# Patient Record
Sex: MALE | Employment: OTHER | URBAN - METROPOLITAN AREA
[De-identification: names, ages, dates, MRNs, and addresses within clinical notes are randomized per-mention and may not be internally consistent; named-entity substitution may affect disease eponyms.]

---

## 2018-06-15 ENCOUNTER — HOSPITAL ENCOUNTER (OUTPATIENT)
Age: 73
Discharge: HOME | End: 2018-06-17
Payer: MEDICARE

## 2018-06-15 PROCEDURE — 87088 URINE BACTERIA CULTURE: CPT

## 2018-06-15 PROCEDURE — 88112 CYTOPATH CELL ENHANCE TECH: CPT

## 2018-06-18 LAB — URINE CULTURE, ROUTINE: NORMAL

## 2019-02-01 ENCOUNTER — HOSPITAL ENCOUNTER (OUTPATIENT)
Age: 74
Discharge: HOME | End: 2019-02-03
Payer: MEDICARE

## 2019-02-01 LAB — PROSTATE SPECIFIC ANTIGEN: 4.24 NG/ML (ref 0–4)

## 2019-02-01 PROCEDURE — 88112 CYTOPATH CELL ENHANCE TECH: CPT

## 2019-02-01 PROCEDURE — G0103 PSA SCREENING: HCPCS

## 2019-02-25 ENCOUNTER — HOSPITAL ENCOUNTER (OUTPATIENT)
Age: 74
Discharge: HOME | End: 2019-02-27
Payer: MEDICARE

## 2019-02-25 ENCOUNTER — HOSPITAL ENCOUNTER (OUTPATIENT)
Dept: GENERAL RADIOLOGY | Age: 74
Discharge: HOME | End: 2019-02-27
Payer: MEDICARE

## 2019-02-25 DIAGNOSIS — N20.0 CALCULUS OF KIDNEY: ICD-10-CM

## 2019-02-25 PROCEDURE — 74018 RADEX ABDOMEN 1 VIEW: CPT

## 2019-05-22 ENCOUNTER — HOSPITAL ENCOUNTER (OUTPATIENT)
Age: 74
Discharge: HOME | End: 2019-05-24
Payer: MEDICARE

## 2019-05-22 PROCEDURE — 87077 CULTURE AEROBIC IDENTIFY: CPT

## 2019-05-22 PROCEDURE — 88112 CYTOPATH CELL ENHANCE TECH: CPT

## 2019-05-22 PROCEDURE — 87088 URINE BACTERIA CULTURE: CPT

## 2019-05-22 PROCEDURE — 87186 SC STD MICRODIL/AGAR DIL: CPT

## 2019-05-24 LAB
ORGANISM: ABNORMAL
URINE CULTURE, ROUTINE: ABNORMAL
URINE CULTURE, ROUTINE: ABNORMAL

## 2019-06-28 ENCOUNTER — HOSPITAL ENCOUNTER (OUTPATIENT)
Age: 74
Discharge: HOME | End: 2019-06-30
Payer: MEDICARE

## 2019-06-28 PROCEDURE — 88112 CYTOPATH CELL ENHANCE TECH: CPT

## 2024-05-30 ENCOUNTER — HOSPITAL ENCOUNTER (OUTPATIENT)
Dept: RESPIRATORY THERAPY | Facility: HOSPITAL | Age: 79
Discharge: HOME | End: 2024-05-30
Payer: MEDICARE

## 2024-05-30 ENCOUNTER — OFFICE VISIT (OUTPATIENT)
Dept: PULMONOLOGY | Facility: HOSPITAL | Age: 79
End: 2024-05-30
Payer: MEDICARE

## 2024-05-30 ENCOUNTER — LAB (OUTPATIENT)
Dept: LAB | Facility: LAB | Age: 79
End: 2024-05-30
Payer: MEDICARE

## 2024-05-30 VITALS
HEIGHT: 66 IN | RESPIRATION RATE: 16 BRPM | HEART RATE: 66 BPM | SYSTOLIC BLOOD PRESSURE: 142 MMHG | BODY MASS INDEX: 27.61 KG/M2 | DIASTOLIC BLOOD PRESSURE: 75 MMHG | OXYGEN SATURATION: 96 % | TEMPERATURE: 97.8 F | WEIGHT: 171.8 LBS

## 2024-05-30 DIAGNOSIS — J84.9 INTERSTITIAL LUNG DISEASE (MULTI): ICD-10-CM

## 2024-05-30 DIAGNOSIS — J84.10 PULMONARY FIBROSIS (MULTI): ICD-10-CM

## 2024-05-30 DIAGNOSIS — J30.9 ALLERGIC RHINITIS, UNSPECIFIED SEASONALITY, UNSPECIFIED TRIGGER: ICD-10-CM

## 2024-05-30 DIAGNOSIS — G47.33 OSA ON CPAP: ICD-10-CM

## 2024-05-30 DIAGNOSIS — Z87.891 FORMER SMOKER: ICD-10-CM

## 2024-05-30 DIAGNOSIS — J84.9 INTERSTITIAL LUNG DISEASE (MULTI): Primary | ICD-10-CM

## 2024-05-30 LAB
CCP IGG SERPL-ACNC: <1 U/ML
RHEUMATOID FACT SER NEPH-ACNC: 34 IU/ML (ref 0–15)

## 2024-05-30 PROCEDURE — 99214 OFFICE O/P EST MOD 30 MIN: CPT | Performed by: NURSE PRACTITIONER

## 2024-05-30 PROCEDURE — 86003 ALLG SPEC IGE CRUDE XTRC EA: CPT

## 2024-05-30 PROCEDURE — 86606 ASPERGILLUS ANTIBODY: CPT

## 2024-05-30 PROCEDURE — 94618 PULMONARY STRESS TESTING: CPT

## 2024-05-30 PROCEDURE — 86038 ANTINUCLEAR ANTIBODIES: CPT

## 2024-05-30 PROCEDURE — 36415 COLL VENOUS BLD VENIPUNCTURE: CPT

## 2024-05-30 PROCEDURE — 86331 IMMUNODIFFUSION OUCHTERLONY: CPT

## 2024-05-30 PROCEDURE — 86200 CCP ANTIBODY: CPT

## 2024-05-30 PROCEDURE — 86431 RHEUMATOID FACTOR QUANT: CPT

## 2024-05-30 PROCEDURE — 86225 DNA ANTIBODY NATIVE: CPT

## 2024-05-30 PROCEDURE — 86235 NUCLEAR ANTIGEN ANTIBODY: CPT

## 2024-05-30 PROCEDURE — 82785 ASSAY OF IGE: CPT

## 2024-05-30 RX ORDER — CYCLOBENZAPRINE HCL 5 MG
TABLET ORAL
COMMUNITY
Start: 2024-04-18

## 2024-05-30 RX ORDER — LEVOTHYROXINE SODIUM 25 UG/1
1 TABLET ORAL DAILY
COMMUNITY
Start: 2022-11-14

## 2024-05-30 RX ORDER — METOPROLOL SUCCINATE 50 MG/1
50 TABLET, EXTENDED RELEASE ORAL DAILY
COMMUNITY

## 2024-05-30 RX ORDER — TRAZODONE HYDROCHLORIDE 50 MG/1
TABLET ORAL
COMMUNITY
Start: 2024-03-14

## 2024-05-30 RX ORDER — AMLODIPINE BESYLATE 10 MG/1
TABLET ORAL
COMMUNITY
Start: 2024-03-14

## 2024-05-30 RX ORDER — FLUTICASONE PROPIONATE 50 MCG
1 SPRAY, SUSPENSION (ML) NASAL 2 TIMES DAILY
Qty: 1 G | Refills: 11 | Status: SHIPPED | OUTPATIENT
Start: 2024-05-30

## 2024-05-30 RX ORDER — SERTRALINE HYDROCHLORIDE 50 MG/1
1 TABLET, FILM COATED ORAL DAILY
COMMUNITY
Start: 2016-08-26

## 2024-05-30 RX ORDER — BUSPIRONE HYDROCHLORIDE 10 MG/1
1 TABLET ORAL 3 TIMES DAILY
COMMUNITY
Start: 2024-01-16

## 2024-05-30 RX ORDER — ATORVASTATIN CALCIUM 20 MG/1
10 TABLET, FILM COATED ORAL
COMMUNITY
Start: 2024-03-05

## 2024-05-30 RX ORDER — TAMSULOSIN HYDROCHLORIDE 0.4 MG/1
CAPSULE ORAL
COMMUNITY
Start: 2023-04-19

## 2024-05-30 RX ORDER — DEXTROMETHORPHAN HYDROBROMIDE, GUAIFENESIN 5; 100 MG/5ML; MG/5ML
650 LIQUID ORAL EVERY 8 HOURS PRN
COMMUNITY

## 2024-05-30 ASSESSMENT — PATIENT HEALTH QUESTIONNAIRE - PHQ9
1. LITTLE INTEREST OR PLEASURE IN DOING THINGS: NOT AT ALL
2. FEELING DOWN, DEPRESSED OR HOPELESS: NOT AT ALL
SUM OF ALL RESPONSES TO PHQ9 QUESTIONS 1 AND 2: 0

## 2024-05-30 ASSESSMENT — ENCOUNTER SYMPTOMS
UNEXPECTED WEIGHT CHANGE: 0
DEPRESSION: 0
SHORTNESS OF BREATH: 1
LOSS OF SENSATION IN FEET: 0
OCCASIONAL FEELINGS OF UNSTEADINESS: 0
COUGH: 0
FATIGUE: 0
FEVER: 0
CHILLS: 0
RHINORRHEA: 0
WHEEZING: 0

## 2024-05-30 ASSESSMENT — COLUMBIA-SUICIDE SEVERITY RATING SCALE - C-SSRS
6. HAVE YOU EVER DONE ANYTHING, STARTED TO DO ANYTHING, OR PREPARED TO DO ANYTHING TO END YOUR LIFE?: NO
2. HAVE YOU ACTUALLY HAD ANY THOUGHTS OF KILLING YOURSELF?: NO
1. IN THE PAST MONTH, HAVE YOU WISHED YOU WERE DEAD OR WISHED YOU COULD GO TO SLEEP AND NOT WAKE UP?: NO

## 2024-05-30 NOTE — PATIENT INSTRUCTIONS
Please get CT scan of your chest done.  Please get oxygen test before next visit.  Please get blood work done.  Start on Flonase nasal spray twice a day as needed.  You can also take Loratadine 10 mg daily as needed for sinus drainage.  Call with any questions or concerns.  Follow up with Dr. Rojas in 2 months.

## 2024-05-30 NOTE — PROGRESS NOTES
Subjective   Patient ID: Alin Kessler is a 78 y.o. male who presents for NPV for pulmonary fibrosis.     HPI: Patient has PMH of CAD, depression, HTN, HLD, ALEX, and atrial fibrillation. He is here for a second opinion. His wife and son are present with him today. He states that he was getting preoperative clearance for an ablation and he was told he had pulmonary fibrosis. He had one CT chest done in September 2023. He then saw a pulmonologist that told him he had mild idiopathic pulmonary fibrosis and that he needed to enjoy life, wife reports they told him he had about 3-5 years life expectancy. He is not on any oxygen. He states that he has shortness of breath but this has improved after having the ablation. He has an occasional productive cough and sinus drainage. He denies any history of CTD. He is a former smoker of only 10 years, quit 40 years ago and smoked 1 ppd. He has a history of working in factories. He has ALEX and is on CPAP. His sister had pulmonary fibrosis also.     Review of Systems   Constitutional:  Negative for chills, fatigue, fever and unexpected weight change.   HENT:  Negative for congestion, postnasal drip and rhinorrhea.    Respiratory:  Positive for shortness of breath. Negative for cough (denies hemoptysis.) and wheezing.    Cardiovascular:  Negative for chest pain and leg swelling.   All other systems reviewed and are negative.      Objective   Physical Exam  Vitals reviewed.   Constitutional:       Appearance: Normal appearance.   HENT:      Head: Normocephalic.   Cardiovascular:      Rate and Rhythm: Normal rate and regular rhythm.   Pulmonary:      Effort: Pulmonary effort is normal.      Breath sounds: Normal breath sounds.   Skin:     General: Skin is warm and dry.   Neurological:      Mental Status: He is alert.         Assessment/Plan   Pulmonary fibrosis/ILD  Former light smoker  ALEX, on CPAP  Allergic rhinitis    Plan:    Patient is here for a second opinion for pulmonary  fibrosis. He states prior pulmonologist diagnosed him with idiopathic pulmonary fibrosis and stated his life expectancy was 3-5 years. He brought his PFT results today which showed no restriction and no obstruction, his DLCO was reduced at 39. He had one CT chest that was done September 2023, I do not have images but the report states mild bronchiectasis in lower lungs and sub linear fibrotic changes without. He is short of breath on exertion but states this has improved after having his ablation. He is not on any oxygen, he is 96% on RA.    -HRCT ordered.  -PFTs from April 2024 with no obstruction or restriction, TLC was 96, DLCO 39. Discussed results at length.   -CTD workup sent, HP panel, IGE/RAST ordered.  -He is a former smoker at 1 ppd x 10 years only, quit 40 years ago. No obstruction on PFTs.  -6MWT done today in office, he did not qualify for supplemental oxygen he was at his lowest 90% on RA.  -He has ALEX and is on CPAP nightly.   -Recommend Loratadine and Flonase prn.     Overall I discussed at length that with current testing I have currently this does not appear to be a progressive ILD. Patient's CT chest does not mention honeycombing or traction bronchiectasis. Patient has not had any lung biopsy in the past. His breathing has actually improved since initial CT chest done in September 2023, he does not qualify for oxygen, and his PFTs have no restriction. I will get complete ILD workup for him at this time and bring him back with Dr. Rojas in a few weeks for follow up.

## 2024-05-31 LAB

## 2024-06-03 LAB
ANA PATTERN: ABNORMAL
ANA SER QL HEP2 SUBST: POSITIVE
ANA TITR SER IF: ABNORMAL {TITER}
CENTROMERE B AB SER-ACNC: <0.2 AI
CHROMATIN AB SERPL-ACNC: <0.2 AI
DSDNA AB SER-ACNC: 2 IU/ML
ENA JO1 AB SER QL IA: <0.2 AI
ENA RNP AB SER IA-ACNC: <0.2 AI
ENA SCL70 AB SER QL IA: <0.2 AI
ENA SM AB SER IA-ACNC: <0.2 AI
ENA SM+RNP AB SER QL IA: <0.2 AI
ENA SS-A AB SER IA-ACNC: <0.2 AI
ENA SS-B AB SER IA-ACNC: <0.2 AI
RIBOSOMAL P AB SER-ACNC: <0.2 AI

## 2024-06-04 ENCOUNTER — TELEPHONE (OUTPATIENT)
Dept: PULMONOLOGY | Facility: HOSPITAL | Age: 79
End: 2024-06-04
Payer: MEDICARE

## 2024-06-04 NOTE — TELEPHONE ENCOUNTER
Patients son acknowledged understanding. All questions answered at this time.     ----- Message from ZIYAD Fuchs sent at 6/4/2024 11:18 AM EDT -----  The positive YARED is nonspecific because the rest of the panel was negative. That blood work is ruling out connective tissue diseases as we discussed. I am still waiting on the rest of the blood work to result. Dr. Rojas will also discuss all of this further at his follow up but nothing to worry on.   ----- Message -----  From: Dalia Griffin RN  Sent: 6/4/2024   9:26 AM EDT  To: ZIYAD Fuchs    Patients son calling in requesting blood work results. He saw on the my chart that the YARED was positive and wants to know what that means. His number is [phone] for me to call him back.

## 2024-06-06 LAB
A FUMIGATUS1 AB SER QL ID: NORMAL
A FUMIGATUS6 AB SER QL ID: NORMAL
A PULLULANS AB SER QL ID: NORMAL
PIGEON SERUM AB QL ID: NORMAL
S RECTIVIRGULA AB SER QL ID: NORMAL

## 2024-06-18 ENCOUNTER — HOSPITAL ENCOUNTER (OUTPATIENT)
Dept: RADIOLOGY | Facility: HOSPITAL | Age: 79
Discharge: HOME | End: 2024-06-18
Payer: MEDICARE

## 2024-06-18 DIAGNOSIS — J84.10 PULMONARY FIBROSIS (MULTI): ICD-10-CM

## 2024-06-18 DIAGNOSIS — J84.9 INTERSTITIAL LUNG DISEASE (MULTI): ICD-10-CM

## 2024-06-18 PROCEDURE — 71250 CT THORAX DX C-: CPT

## 2024-06-18 PROCEDURE — 71250 CT THORAX DX C-: CPT | Performed by: RADIOLOGY

## 2024-06-21 ENCOUNTER — TELEPHONE (OUTPATIENT)
Dept: PULMONOLOGY | Facility: HOSPITAL | Age: 79
End: 2024-06-21
Payer: MEDICARE

## 2024-06-21 ENCOUNTER — HOSPITAL ENCOUNTER (OUTPATIENT)
Dept: RADIOLOGY | Facility: EXTERNAL LOCATION | Age: 79
Discharge: HOME | End: 2024-06-21

## 2024-06-21 NOTE — TELEPHONE ENCOUNTER
Patient acknowledged understanding. All questions answered at this time.     ----- Message from LEONORA Fuchs-CNP sent at 6/21/2024  9:58 AM EDT -----  Please call the patient and let him know that his CT chest does show still some mild scarring. I reviewed it with Dr. Rojas it does not at this time have features of the IPF (idiopathic pulmonary fibrosis) as I discussed with them at their appointment. Dr. Rojas will go over everything further at his upcoming appointment in a few weeks but I wanted to let him know in the meantime!

## 2024-07-18 ENCOUNTER — OFFICE VISIT (OUTPATIENT)
Dept: PULMONOLOGY | Facility: HOSPITAL | Age: 79
End: 2024-07-18
Payer: MEDICARE

## 2024-07-18 VITALS
HEIGHT: 66 IN | WEIGHT: 174.6 LBS | HEART RATE: 57 BPM | RESPIRATION RATE: 16 BRPM | SYSTOLIC BLOOD PRESSURE: 143 MMHG | DIASTOLIC BLOOD PRESSURE: 69 MMHG | TEMPERATURE: 98 F | BODY MASS INDEX: 28.06 KG/M2 | OXYGEN SATURATION: 98 %

## 2024-07-18 DIAGNOSIS — J30.9 ALLERGIC RHINITIS, UNSPECIFIED SEASONALITY, UNSPECIFIED TRIGGER: ICD-10-CM

## 2024-07-18 DIAGNOSIS — G47.33 OSA ON CPAP: ICD-10-CM

## 2024-07-18 DIAGNOSIS — R91.1 SOLITARY LUNG NODULE: ICD-10-CM

## 2024-07-18 DIAGNOSIS — Z87.891 FORMER SMOKER: ICD-10-CM

## 2024-07-18 DIAGNOSIS — J84.9 INTERSTITIAL LUNG DISEASE (MULTI): Primary | ICD-10-CM

## 2024-07-18 PROCEDURE — 99214 OFFICE O/P EST MOD 30 MIN: CPT | Performed by: INTERNAL MEDICINE

## 2024-07-18 PROCEDURE — 1159F MED LIST DOCD IN RCRD: CPT | Performed by: INTERNAL MEDICINE

## 2024-07-18 PROCEDURE — 1036F TOBACCO NON-USER: CPT | Performed by: INTERNAL MEDICINE

## 2024-07-18 ASSESSMENT — ENCOUNTER SYMPTOMS
RHINORRHEA: 0
UNEXPECTED WEIGHT CHANGE: 0
OCCASIONAL FEELINGS OF UNSTEADINESS: 0
FEVER: 0
WHEEZING: 0
DEPRESSION: 0
LOSS OF SENSATION IN FEET: 0
COUGH: 0
FATIGUE: 0
SHORTNESS OF BREATH: 1
CHILLS: 0

## 2024-07-18 ASSESSMENT — PATIENT HEALTH QUESTIONNAIRE - PHQ9
SUM OF ALL RESPONSES TO PHQ9 QUESTIONS 1 AND 2: 0
2. FEELING DOWN, DEPRESSED OR HOPELESS: NOT AT ALL
1. LITTLE INTEREST OR PLEASURE IN DOING THINGS: NOT AT ALL

## 2024-07-18 ASSESSMENT — COLUMBIA-SUICIDE SEVERITY RATING SCALE - C-SSRS
2. HAVE YOU ACTUALLY HAD ANY THOUGHTS OF KILLING YOURSELF?: NO
1. IN THE PAST MONTH, HAVE YOU WISHED YOU WERE DEAD OR WISHED YOU COULD GO TO SLEEP AND NOT WAKE UP?: NO
6. HAVE YOU EVER DONE ANYTHING, STARTED TO DO ANYTHING, OR PREPARED TO DO ANYTHING TO END YOUR LIFE?: NO

## 2024-07-18 NOTE — PATIENT INSTRUCTIONS
Please get CT scan of chest in Dec 2024.  Continue on Flonase nasal spray twice a day as needed.  Continue to take Loratadine 10 mg daily as needed for sinus drainage.  Call with any questions or concerns.  Follow up with Dr. Rojas in 6 months.

## 2024-07-18 NOTE — PROGRESS NOTES
Subjective   Patient ID: Alin Kessler is a 78 y.o. male who presents for Follow up for pulmonary fibrosis.     HPI: Patient has PMH of CAD, depression, HTN, HLD, ALEX, and atrial fibrillation. He is here for a second opinion. His wife and son are present with him today. He states that he was getting preoperative clearance for an ablation and he was told he had pulmonary fibrosis. He had one CT chest done in September 2023. He then saw a pulmonologist that told him he had mild idiopathic pulmonary fibrosis and that he needed to enjoy life, wife reports they told him he had about 3-5 years life expectancy. He is not on any oxygen. He states that he has shortness of breath but this has improved after having the ablation. He has an occasional productive cough and sinus drainage. He denies any history of CTD. He is a former smoker of only 10 years, quit 40 years ago and smoked 1 ppd. He has a history of working in factories. He has ALEX and is on CPAP. His sister had pulmonary fibrosis also.     He is here for follow up accompanied by his wife and son. He states he is doing well. He climbs up 14 steps to go his house without shortness of breath. He still has mild cough related to postnasal drip.  Wife states that he is a couch potato. 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 He wears his CPAP nightly and has controlled daytime symptoms and good sleep quality.     Review of Systems   Constitutional:  Negative for chills, fatigue, fever and unexpected weight change.   HENT:  Negative for congestion, postnasal drip and rhinorrhea.    Respiratory:  Positive for shortness of breath. Negative for cough (denies hemoptysis.) and wheezing.    Cardiovascular:  Negative for chest pain and leg swelling.   All other systems reviewed and are negative.      Objective   Physical Exam  Vitals reviewed.   Constitutional:       Appearance: Normal appearance.   HENT:      Head:  "Normocephalic.   Cardiovascular:      Rate and Rhythm: Normal rate and regular rhythm.   Pulmonary:      Effort: Pulmonary effort is normal.      Breath sounds: Rales present.   Skin:     General: Skin is warm and dry.   Neurological:      Mental Status: He is alert.       Assessment/Plan   Pulmonary fibrosis/ILD  Former light smoker  ALEX, on CPAP  Allergic rhinitis  Anemia  Lung nodule 7 mm lingula    Plan:    Patient is here for a second opinion for pulmonary fibrosis. He states prior pulmonologist diagnosed him with idiopathic pulmonary fibrosis and stated his life expectancy was 3-5 years. He brought his PFT results today which showed no restriction and no obstruction, his DLCO was reduced at 39. He had one CT chest that was done September 2023, the report states mild bronchiectasis in lower lungs and sub linear fibrotic changes without. He is short of breath on exertion but states this has improved after having his ablation. He is not on any oxygen, he is 96% on RA.    -HRCT repeat discussed from June 2024 with subpleural reticulations and traction bronchiectasis and no honeycombing \"probable UIP' and 7 mm lingular nodule.   -PFTs from April 2024 with no obstruction or restriction, TLC was 96, DLCO 39. Discussed results at length.   -CTD, HP panel, IGE/RAST neg. RA factor and YARED + but ccp neg and YARED positive with DSDNA neg.   -He is a former smoker at 1 ppd x 10 years, quit 40 years ago. No obstruction on PFTs.  -6MWT done today in office, he did not qualify for supplemental oxygen he was at his lowest 90% on RA.  -He has ALEX and is on CPAP nightly.   -Recommend Loratadine and Flonase prn.   -Hb in 2018 around 8 g. Recommend obtain repeat test results from PCP.     Overall I discussed at length that with current testing I have currently this does not appear to be a progressive ILD. D/d includes postinflammatory pulmonary fibrosis. His breathing has actually improved since ablation, he does not qualify for " oxygen, and his PFTs have no restriction. His CTD and HP panel work up was practically negative. He is compliant with his CPAP. We concluded our discussion with recommendation of repeating CT chest in 6 months for both ILD and lung nodule and in the meanwhile he will remain active with a regular physical activity regimen. If he has any worsening prior to next OV pt and his family will contact us earlier than scheduled visit.

## 2024-08-13 ENCOUNTER — TELEPHONE (OUTPATIENT)
Age: 79
End: 2024-08-13

## 2024-08-13 NOTE — TELEPHONE ENCOUNTER
Maritza called, Kevyn does not get pain shots or see the pain doctors any longer. He would like you to order him some Tramadol 50mg to take as needed at [facility] in [place]

## 2024-09-08 RX ORDER — TRAZODONE HYDROCHLORIDE 50 MG/1
50 TABLET, FILM COATED ORAL NIGHTLY
Qty: 90 TABLET | Refills: 0 | Status: SHIPPED | OUTPATIENT
Start: 2024-09-08

## 2024-09-11 ENCOUNTER — TELEPHONE (OUTPATIENT)
Age: 79
End: 2024-09-11

## 2024-09-11 DIAGNOSIS — J20.8 ACUTE BRONCHITIS DUE TO OTHER SPECIFIED ORGANISMS: Primary | ICD-10-CM

## 2024-09-11 RX ORDER — BENZONATATE 200 MG/1
200 CAPSULE ORAL 3 TIMES DAILY PRN
Qty: 21 CAPSULE | Refills: 0 | Status: SHIPPED | OUTPATIENT
Start: 2024-09-11 | End: 2024-09-18

## 2024-09-11 RX ORDER — DOXYCYCLINE HYCLATE 100 MG
100 TABLET ORAL 2 TIMES DAILY
Qty: 20 TABLET | Refills: 0 | Status: SHIPPED | OUTPATIENT
Start: 2024-09-11 | End: 2024-09-21

## 2024-09-12 ENCOUNTER — TELEPHONE (OUTPATIENT)
Age: 79
End: 2024-09-12

## 2024-10-03 DIAGNOSIS — N40.1 BENIGN PROSTATIC HYPERPLASIA WITH INCOMPLETE BLADDER EMPTYING: Primary | ICD-10-CM

## 2024-10-03 DIAGNOSIS — R39.14 BENIGN PROSTATIC HYPERPLASIA WITH INCOMPLETE BLADDER EMPTYING: Primary | ICD-10-CM

## 2024-10-22 ENCOUNTER — APPOINTMENT (OUTPATIENT)
Dept: PULMONOLOGY | Facility: HOSPITAL | Age: 79
End: 2024-10-22
Payer: MEDICARE

## 2024-11-20 LAB
ALBUMIN: NORMAL
ALP BLD-CCNC: NORMAL U/L
ALT SERPL-CCNC: NORMAL U/L
ANION GAP SERPL CALCULATED.3IONS-SCNC: NORMAL MMOL/L
AST SERPL-CCNC: NORMAL U/L
BASOPHILS ABSOLUTE: NORMAL
BASOPHILS RELATIVE PERCENT: NORMAL
BILIRUB SERPL-MCNC: NORMAL MG/DL
BUN BLDV-MCNC: NORMAL MG/DL
CALCIUM SERPL-MCNC: NORMAL MG/DL
CHLORIDE BLD-SCNC: NORMAL MMOL/L
CHOLESTEROL, TOTAL: NORMAL
CHOLESTEROL/HDL RATIO: NORMAL
CO2: NORMAL
CREAT SERPL-MCNC: NORMAL MG/DL
EOSINOPHILS ABSOLUTE: NORMAL
EOSINOPHILS RELATIVE PERCENT: NORMAL
GFR, ESTIMATED: NORMAL
GLUCOSE BLD-MCNC: NORMAL MG/DL
HCT VFR BLD CALC: NORMAL %
HDLC SERPL-MCNC: NORMAL MG/DL
HEMOGLOBIN: NORMAL
LDL CHOLESTEROL: NORMAL
LYMPHOCYTES ABSOLUTE: NORMAL
LYMPHOCYTES RELATIVE PERCENT: NORMAL
MCH RBC QN AUTO: NORMAL PG
MCHC RBC AUTO-ENTMCNC: NORMAL G/DL
MCV RBC AUTO: NORMAL FL
MONOCYTES ABSOLUTE: NORMAL
MONOCYTES RELATIVE PERCENT: NORMAL
NEUTROPHILS ABSOLUTE: NORMAL
NEUTROPHILS RELATIVE PERCENT: NORMAL
NONHDLC SERPL-MCNC: NORMAL MG/DL
PLATELET # BLD: NORMAL 10*3/UL
PMV BLD AUTO: NORMAL FL
POTASSIUM SERPL-SCNC: NORMAL MMOL/L
RBC # BLD: NORMAL 10*6/UL
SODIUM BLD-SCNC: NORMAL MMOL/L
TOTAL PROTEIN: NORMAL
TRIGL SERPL-MCNC: NORMAL MG/DL
VLDLC SERPL CALC-MCNC: NORMAL MG/DL
WBC # BLD: NORMAL 10*3/UL

## 2024-11-21 RX ORDER — LEVOTHYROXINE SODIUM 25 UG/1
25 TABLET ORAL DAILY
COMMUNITY
Start: 2024-09-23

## 2024-11-24 SDOH — ECONOMIC STABILITY: FOOD INSECURITY: WITHIN THE PAST 12 MONTHS, THE FOOD YOU BOUGHT JUST DIDN'T LAST AND YOU DIDN'T HAVE MONEY TO GET MORE.: NEVER TRUE

## 2024-11-24 SDOH — ECONOMIC STABILITY: FOOD INSECURITY: WITHIN THE PAST 12 MONTHS, YOU WORRIED THAT YOUR FOOD WOULD RUN OUT BEFORE YOU GOT MONEY TO BUY MORE.: NEVER TRUE

## 2024-11-24 SDOH — ECONOMIC STABILITY: TRANSPORTATION INSECURITY
IN THE PAST 12 MONTHS, HAS LACK OF TRANSPORTATION KEPT YOU FROM MEETINGS, WORK, OR FROM GETTING THINGS NEEDED FOR DAILY LIVING?: NO

## 2024-11-24 SDOH — ECONOMIC STABILITY: INCOME INSECURITY: HOW HARD IS IT FOR YOU TO PAY FOR THE VERY BASICS LIKE FOOD, HOUSING, MEDICAL CARE, AND HEATING?: NOT HARD AT ALL

## 2024-11-25 ENCOUNTER — OFFICE VISIT (OUTPATIENT)
Age: 79
End: 2024-11-25
Payer: MEDICARE

## 2024-11-25 VITALS
HEIGHT: 68 IN | OXYGEN SATURATION: 98 % | TEMPERATURE: 97.3 F | BODY MASS INDEX: 25.98 KG/M2 | RESPIRATION RATE: 16 BRPM | WEIGHT: 171.4 LBS | HEART RATE: 53 BPM | DIASTOLIC BLOOD PRESSURE: 63 MMHG | SYSTOLIC BLOOD PRESSURE: 125 MMHG

## 2024-11-25 DIAGNOSIS — I10 BENIGN HYPERTENSION: Primary | ICD-10-CM

## 2024-11-25 DIAGNOSIS — E78.2 MIXED HYPERLIPIDEMIA: ICD-10-CM

## 2024-11-25 DIAGNOSIS — I25.10 CORONARY ARTERY DISEASE INVOLVING NATIVE CORONARY ARTERY OF NATIVE HEART WITHOUT ANGINA PECTORIS: ICD-10-CM

## 2024-11-25 DIAGNOSIS — K40.20 NON-RECURRENT BILATERAL INGUINAL HERNIA WITHOUT OBSTRUCTION OR GANGRENE: ICD-10-CM

## 2024-11-25 DIAGNOSIS — I48.0 PAROXYSMAL A-FIB (HCC): ICD-10-CM

## 2024-11-25 DIAGNOSIS — E03.9 PRIMARY HYPOTHYROIDISM: ICD-10-CM

## 2024-11-25 DIAGNOSIS — C78.6 MALIGNANT NEOPLASM METASTATIC TO PERITONEUM (HCC): ICD-10-CM

## 2024-11-25 PROCEDURE — 99214 OFFICE O/P EST MOD 30 MIN: CPT | Performed by: FAMILY MEDICINE

## 2024-11-25 PROCEDURE — 4004F PT TOBACCO SCREEN RCVD TLK: CPT | Performed by: FAMILY MEDICINE

## 2024-11-25 PROCEDURE — 1123F ACP DISCUSS/DSCN MKR DOCD: CPT | Performed by: FAMILY MEDICINE

## 2024-11-25 PROCEDURE — 1126F AMNT PAIN NOTED NONE PRSNT: CPT | Performed by: FAMILY MEDICINE

## 2024-11-25 PROCEDURE — 1159F MED LIST DOCD IN RCRD: CPT | Performed by: FAMILY MEDICINE

## 2024-11-25 PROCEDURE — G8427 DOCREV CUR MEDS BY ELIG CLIN: HCPCS | Performed by: FAMILY MEDICINE

## 2024-11-25 PROCEDURE — 3078F DIAST BP <80 MM HG: CPT | Performed by: FAMILY MEDICINE

## 2024-11-25 PROCEDURE — G8419 CALC BMI OUT NRM PARAM NOF/U: HCPCS | Performed by: FAMILY MEDICINE

## 2024-11-25 PROCEDURE — 3074F SYST BP LT 130 MM HG: CPT | Performed by: FAMILY MEDICINE

## 2024-11-25 PROCEDURE — G8482 FLU IMMUNIZE ORDER/ADMIN: HCPCS | Performed by: FAMILY MEDICINE

## 2024-11-25 RX ORDER — FLUTICASONE PROPIONATE 50 MCG
2 SPRAY, SUSPENSION (ML) NASAL DAILY
COMMUNITY
Start: 2024-09-12

## 2024-11-25 RX ORDER — AMLODIPINE BESYLATE 10 MG/1
1 TABLET ORAL DAILY
COMMUNITY
Start: 2024-03-14

## 2024-11-25 RX ORDER — METOPROLOL SUCCINATE 100 MG/1
50 TABLET, EXTENDED RELEASE ORAL
COMMUNITY
Start: 2024-09-12

## 2024-11-25 RX ORDER — ATORVASTATIN CALCIUM 20 MG/1
20 TABLET, FILM COATED ORAL DAILY
COMMUNITY

## 2024-11-25 RX ORDER — BUSPIRONE HYDROCHLORIDE 10 MG/1
1 TABLET ORAL 3 TIMES DAILY
COMMUNITY
Start: 2024-01-16

## 2024-11-25 RX ORDER — FOLIC ACID 1 MG/1
1 TABLET ORAL DAILY
COMMUNITY
Start: 2024-09-12 | End: 2024-11-25

## 2024-11-25 ASSESSMENT — PATIENT HEALTH QUESTIONNAIRE - PHQ9
1. LITTLE INTEREST OR PLEASURE IN DOING THINGS: NOT AT ALL
SUM OF ALL RESPONSES TO PHQ QUESTIONS 1-9: 0
2. FEELING DOWN, DEPRESSED OR HOPELESS: NOT AT ALL
SUM OF ALL RESPONSES TO PHQ QUESTIONS 1-9: 0
SUM OF ALL RESPONSES TO PHQ9 QUESTIONS 1 & 2: 0

## 2024-11-25 ASSESSMENT — ENCOUNTER SYMPTOMS
GASTROINTESTINAL NEGATIVE: 1
FACIAL SWELLING: 0
RESPIRATORY NEGATIVE: 1
ALLERGIC/IMMUNOLOGIC NEGATIVE: 1
SINUS PAIN: 0
EYES NEGATIVE: 1
RECTAL PAIN: 0

## 2024-11-25 NOTE — ASSESSMENT & PLAN NOTE
I increased his thyroid medication to 1 a day with 2 onset    Orders:    TSH; Future    T4, Free; Future

## 2024-11-25 NOTE — PROGRESS NOTES
Kevyn Sánchez (:  1945) is a 79 y.o. male,Established patient, here for evaluation of the following chief complaint(s):  Pre-op Exam (Pt is scheduled for hernia repair on Dec 10 Dr Willoughby at  [facility].  Pt states doing ok.  Recent labs done at [facility]/in [place] and copy given to pt.   Pt just needs note of clearance with office notes and testing faxed to [phone].  Spoke to office/no form to fill out.  ), Back Pain (Pt is complaining of some back pain which \"moves around to different areas\".  ), Hypothyroidism, and Hyperlipidemia         Assessment & Plan  Benign hypertension   Chronic, at goal (stable), continue current treatment plan, medication adherence emphasized, and lifestyle modifications recommended    Orders:    Comprehensive Metabolic Panel, Fasting; Future    CBC with Auto Differential; Future    Lipid, Fasting; Future    Mixed hyperlipidemia   Chronic, at goal (stable), continue current treatment plan, medication adherence emphasized, and lifestyle modifications recommended    Orders:    Comprehensive Metabolic Panel, Fasting; Future    CBC with Auto Differential; Future    Lipid, Fasting; Future    Coronary artery disease involving native coronary artery of native heart without angina pectoris   Chronic, at goal (stable), continue current treatment plan, medication adherence emphasized, and lifestyle modifications recommended         Primary hypothyroidism    I increased his thyroid medication to 1 a day with 2 onset    Orders:    TSH; Future    T4, Free; Future    Non-recurrent bilateral inguinal hernia without obstruction or gangrene    He is medically cleared for this upcoming surgery.  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

## 2024-12-06 NOTE — PROGRESS NOTES
[facility] PRE-ADMISSION TESTING INSTRUCTIONS    The Preadmission Testing patient is instructed accordingly using the following criteria (check applicable):    ARRIVAL INSTRUCTIONS:  [x] Parking the day of Surgery is located in the Main Entrance lot.  Upon entering the door, make an immediate right to the surgery reception desk    [x] Bring photo ID and insurance card    [x] Bring in a copy of Living will or Durable Power of  papers.    [x] Please be sure to arrange for a responsible adult to provide transportation to and from the hospital    [x] Please arrange for a responsible adult to be with you for the 24 hour period post procedure due to having anesthesia    [x] If you awake am of surgery not feeling well or have temperature >100 please call [phone]    GENERAL INSTRUCTIONS:    [x] No solid foods after midnight, no gum, candy or mints.  May have clear liquids until 4 hours prior to surgery.         [x] You may brush your teeth, do not swallow any toothpaste    [x] Take medications as instructed     [x] Stop herbal supplements and vitamins 5 days prior to procedure    [x] Follow preop dosing of blood thinners per physician instructions    [] Take 1/2 dose of evening insulin, but no insulin after midnight    [] No oral diabetic medications after midnight    [] If diabetic and have low blood sugar or feel symptomatic, take 1-2oz apple juice only    [] Bring inhalers day of surgery    [] Bring urine specimen day of surgery    [x] Shower or bath with soap, lather and rinse well, AM of Surgery, no lotion, powders or creams to surgical site    [] Follow bowel prep as instructed per surgeon    [x] No tobacco products within 24 hours of surgery     [x] No alcohol or illegal drug use, marijuana included, within 24 hours of surgery.    [x] Jewelry, body piercing's, eyeglasses, contact lenses and dentures are not permitted into surgery (bring cases)      [x] Please do not wear any

## 2024-12-09 ENCOUNTER — PREP FOR PROCEDURE (OUTPATIENT)
Dept: SURGERY | Age: 79
End: 2024-12-09

## 2024-12-09 DIAGNOSIS — F51.01 PRIMARY INSOMNIA: Primary | ICD-10-CM

## 2024-12-09 RX ORDER — TRAZODONE HYDROCHLORIDE 50 MG/1
50 TABLET, FILM COATED ORAL NIGHTLY
Qty: 90 TABLET | Refills: 3 | Status: SHIPPED | OUTPATIENT
Start: 2024-12-09

## 2024-12-09 RX ORDER — SODIUM CHLORIDE 0.9 % (FLUSH) 0.9 %
5-40 SYRINGE (ML) INJECTION PRN
Status: CANCELLED | OUTPATIENT
Start: 2024-12-09

## 2024-12-09 RX ORDER — SODIUM CHLORIDE 0.9 % (FLUSH) 0.9 %
5-40 SYRINGE (ML) INJECTION EVERY 12 HOURS SCHEDULED
Status: CANCELLED | OUTPATIENT
Start: 2024-12-09

## 2024-12-09 RX ORDER — SODIUM CHLORIDE, SODIUM LACTATE, POTASSIUM CHLORIDE, CALCIUM CHLORIDE 600; 310; 30; 20 MG/100ML; MG/100ML; MG/100ML; MG/100ML
INJECTION, SOLUTION INTRAVENOUS CONTINUOUS
Status: CANCELLED | OUTPATIENT
Start: 2024-12-09

## 2024-12-09 NOTE — H&P
Chart - [id number]  User  LR       Diagnostics  Provider Notes  Nurse/Allied Health  Medications  History & Problems  Administrative  Other Clinical  Workload Items  Activity  Flowsheets  Health Mgmt  Summary    Summary  Viewing last 15 visits in date range:  03/19/24 - 01/07/25  Scrotal swelling  Urolithiasis  Right inguinal hernia  Thyroid disease  Snoring  Chronic pain  Hx of long term use of blood thinners  BPH (benign prostatic hyperplasia)  Pulmonary fibrosis  Implantable loop recorder present  Lumbosacral radiculopathy  Kidney stone  Hypotension  Cholelithiasis  COVID-19 vaccine series completed  Avascular necrosis  Hyperlipidemia  Atrial fibrillation  Hypertension  Depression  History of right shoulder replacement  History of artificial joint  History of heart surgery  History of back surgery  Status post total shoulder arthroplasty  History of total hip arthroplasty  History of coronary artery bypass graft  11/13/24  11/06/24  05/06/24  12/03/24  07/05/24  05/01/24  10/07/24  05/06/24  11/05/24  05/06/24  11/06/24  04/28/23  05/06/24  05/06/24  11/06/24  11/06/24  11/13/24  05/06/24  11/13/24  11/13/24  11/13/24  05/06/24  05/06/24  11/06/24  11/05/24  Primary Language  English  Preferred Language For Discussing Healthcare  English   Required  Hx MRSA  Hx Vancomycin-Resistant Enterococci  Clinical Trial Participation  Clinical Trial Designation  05/06/24 05/06/24 11/06/24 11/06/24 11/13/24 11/13/24 11/13/24 11/13/24  Employment  RE  Portal  Preferred Phone  [phone]  Address  [address] [place] [address]  Next of Kin  Person to Notify  Kevyn Sánchez (Son)  [phone]  Primary Insurance  Medicare Part A & B  No Data to Display  DATE TIME  LOCATION/  PROVIDER  REASON FOR VISIT  01/07/25  10:30  [facility] Lorraine Coley NP  Kidney, Ureter, Bladder (KUB)  12/10/24  09:00  Gary Harry MD rob bil ing

## 2024-12-09 NOTE — TELEPHONE ENCOUNTER
Name of Medication(s) Requested:  Requested Prescriptions     Pending Prescriptions Disp Refills    traZODone (DESYREL) 50 MG tablet [Pharmacy Med Name: traZODone HCl 50 MG Oral Tablet] 90 tablet 0     Sig: TAKE 1 TABLET BY MOUTH ONCE DAILY AT BEDTIME       Medication is on current medication list Yes    Dosage and directions were verified? Yes    Quantity verified: 90 day supply     Pharmacy Verified?  Yes    Last Appointment:  11/25/2024    Future appts:  Future Appointments   Date Time Provider Department Center   4/8/2025 11:00 AM Zechariah Oquendo DO [facility] DEP        (If no appt send self scheduling link. .REFILLAPPT)  Scheduling request sent?     [] Yes  [x] No    Does patient need updated?  [] Yes  [x] No

## 2024-12-09 NOTE — H&P (VIEW-ONLY)
Chart - [id number]  User  LR       Diagnostics  Provider Notes  Nurse/Allied Health  Medications  History & Problems  Administrative  Other Clinical  Workload Items  Activity  Flowsheets  Health Mgmt  Summary    Summary  Viewing last 15 visits in date range:  03/19/24 - 01/07/25  Scrotal swelling  Urolithiasis  Right inguinal hernia  Thyroid disease  Snoring  Chronic pain  Hx of long term use of blood thinners  BPH (benign prostatic hyperplasia)  Pulmonary fibrosis  Implantable loop recorder present  Lumbosacral radiculopathy  Kidney stone  Hypotension  Cholelithiasis  COVID-19 vaccine series completed  Avascular necrosis  Hyperlipidemia  Atrial fibrillation  Hypertension  Depression  History of right shoulder replacement  History of artificial joint  History of heart surgery  History of back surgery  Status post total shoulder arthroplasty  History of total hip arthroplasty  History of coronary artery bypass graft  11/13/24  11/06/24  05/06/24  12/03/24  07/05/24  05/01/24  10/07/24  05/06/24  11/05/24  05/06/24  11/06/24  04/28/23  05/06/24  05/06/24  11/06/24  11/06/24  11/13/24  05/06/24  11/13/24  11/13/24  11/13/24  05/06/24  05/06/24  11/06/24  11/05/24  Primary Language  English  Preferred Language For Discussing Healthcare  English   Required  Hx MRSA  Hx Vancomycin-Resistant Enterococci  Clinical Trial Participation  Clinical Trial Designation  05/06/24 05/06/24 11/06/24 11/06/24 11/13/24 11/13/24 11/13/24 11/13/24  Employment  RE  Portal  Preferred Phone  [phone]  Address  [address] [place] [address]  Next of Kin  Person to Notify  Kevyn Sánchez (Son)  [phone]  Primary Insurance  Medicare Part A & B  No Data to Display  DATE TIME  LOCATION/  PROVIDER  REASON FOR VISIT  01/07/25  10:30  [facility] Lorraine Coley NP  Kidney, Ureter, Bladder (KUB)  12/10/24  09:00  Gary Harry MD rob bil ing

## 2024-12-10 ENCOUNTER — ANESTHESIA (OUTPATIENT)
Dept: OPERATING ROOM | Age: 79
End: 2024-12-10
Payer: MEDICARE

## 2024-12-10 ENCOUNTER — HOSPITAL ENCOUNTER (OUTPATIENT)
Age: 79
Setting detail: OUTPATIENT SURGERY
Discharge: HOME | End: 2024-12-10
Attending: SURGERY | Admitting: SURGERY
Payer: MEDICARE

## 2024-12-10 ENCOUNTER — ANESTHESIA EVENT (OUTPATIENT)
Dept: OPERATING ROOM | Age: 79
End: 2024-12-10
Payer: MEDICARE

## 2024-12-10 VITALS
BODY MASS INDEX: 26.68 KG/M2 | SYSTOLIC BLOOD PRESSURE: 133 MMHG | HEART RATE: 57 BPM | WEIGHT: 170 LBS | DIASTOLIC BLOOD PRESSURE: 76 MMHG | RESPIRATION RATE: 16 BRPM | HEIGHT: 67 IN | TEMPERATURE: 97.4 F | OXYGEN SATURATION: 97 %

## 2024-12-10 DIAGNOSIS — K40.20 NON-RECURRENT BILATERAL INGUINAL HERNIA WITHOUT OBSTRUCTION OR GANGRENE: Primary | ICD-10-CM

## 2024-12-10 LAB
ANION GAP SERPL CALCULATED.3IONS-SCNC: 11 MMOL/L (ref 7–16)
BUN SERPL-MCNC: 13 MG/DL (ref 6–23)
CALCIUM SERPL-MCNC: 8.8 MG/DL (ref 8.6–10.2)
CHLORIDE SERPL-SCNC: 111 MMOL/L (ref 98–107)
CO2 SERPL-SCNC: 18 MMOL/L (ref 22–29)
CREAT SERPL-MCNC: 1.2 MG/DL (ref 0.7–1.2)
ERYTHROCYTE [DISTWIDTH] IN BLOOD BY AUTOMATED COUNT: 16 % (ref 11.5–15)
GFR, ESTIMATED: 60 ML/MIN/1.73M2
GLUCOSE SERPL-MCNC: 110 MG/DL (ref 74–99)
HCT VFR BLD AUTO: 39.8 % (ref 37–54)
HGB BLD-MCNC: 12.4 G/DL (ref 12.5–16.5)
MCH RBC QN AUTO: 26.1 PG (ref 26–35)
MCHC RBC AUTO-ENTMCNC: 31.2 G/DL (ref 32–34.5)
MCV RBC AUTO: 83.6 FL (ref 80–99.9)
PLATELET # BLD AUTO: 200 K/UL (ref 130–450)
PMV BLD AUTO: 10.4 FL (ref 7–12)
POTASSIUM SERPL-SCNC: 4.2 MMOL/L (ref 3.5–5)
RBC # BLD AUTO: 4.76 M/UL (ref 3.8–5.8)
SODIUM SERPL-SCNC: 140 MMOL/L (ref 132–146)
WBC OTHER # BLD: 11.4 K/UL (ref 4.5–11.5)

## 2024-12-10 PROCEDURE — 6360000002 HC RX W HCPCS

## 2024-12-10 PROCEDURE — 6360000002 HC RX W HCPCS: Performed by: SURGERY

## 2024-12-10 PROCEDURE — 7100000001 HC PACU RECOVERY - ADDTL 15 MIN: Performed by: SURGERY

## 2024-12-10 PROCEDURE — 85027 COMPLETE CBC AUTOMATED: CPT

## 2024-12-10 PROCEDURE — C1781 MESH (IMPLANTABLE): HCPCS | Performed by: SURGERY

## 2024-12-10 PROCEDURE — 2709999900 HC NON-CHARGEABLE SUPPLY: Performed by: SURGERY

## 2024-12-10 PROCEDURE — 3700000001 HC ADD 15 MINUTES (ANESTHESIA): Performed by: SURGERY

## 2024-12-10 PROCEDURE — 3600000019 HC SURGERY ROBOT ADDTL 15MIN: Performed by: SURGERY

## 2024-12-10 PROCEDURE — 2580000003 HC RX 258

## 2024-12-10 PROCEDURE — S2900 ROBOTIC SURGICAL SYSTEM: HCPCS | Performed by: SURGERY

## 2024-12-10 PROCEDURE — 7100000000 HC PACU RECOVERY - FIRST 15 MIN: Performed by: SURGERY

## 2024-12-10 PROCEDURE — 6370000000 HC RX 637 (ALT 250 FOR IP): Performed by: SURGERY

## 2024-12-10 PROCEDURE — 6370000000 HC RX 637 (ALT 250 FOR IP): Performed by: ANESTHESIOLOGY

## 2024-12-10 PROCEDURE — 3700000000 HC ANESTHESIA ATTENDED CARE: Performed by: SURGERY

## 2024-12-10 PROCEDURE — 7100000010 HC PHASE II RECOVERY - FIRST 15 MIN: Performed by: SURGERY

## 2024-12-10 PROCEDURE — 2500000003 HC RX 250 WO HCPCS

## 2024-12-10 PROCEDURE — 7100000011 HC PHASE II RECOVERY - ADDTL 15 MIN: Performed by: SURGERY

## 2024-12-10 PROCEDURE — 2580000003 HC RX 258: Performed by: SURGERY

## 2024-12-10 PROCEDURE — 3600000009 HC SURGERY ROBOT BASE: Performed by: SURGERY

## 2024-12-10 PROCEDURE — 80048 BASIC METABOLIC PNL TOTAL CA: CPT

## 2024-12-10 DEVICE — LAPAROSCOPIC SELF-FIXATING MESH POLYESTER WITH POLYLACTIC ACID GRIPS AND COLLAGEN FILM
Type: IMPLANTABLE DEVICE | Site: ABDOMEN | Status: FUNCTIONAL
Brand: PROGRIP

## 2024-12-10 RX ORDER — SODIUM CHLORIDE, SODIUM LACTATE, POTASSIUM CHLORIDE, CALCIUM CHLORIDE 600; 310; 30; 20 MG/100ML; MG/100ML; MG/100ML; MG/100ML
INJECTION, SOLUTION INTRAVENOUS CONTINUOUS
Status: DISCONTINUED | OUTPATIENT
Start: 2024-12-10 | End: 2024-12-10 | Stop reason: HOSPADM

## 2024-12-10 RX ORDER — DIPHENHYDRAMINE HYDROCHLORIDE 50 MG/ML
12.5 INJECTION INTRAMUSCULAR; INTRAVENOUS
Status: DISCONTINUED | OUTPATIENT
Start: 2024-12-10 | End: 2024-12-10 | Stop reason: HOSPADM

## 2024-12-10 RX ORDER — ACETAMINOPHEN 500 MG
1000 TABLET ORAL ONCE
Status: COMPLETED | OUTPATIENT
Start: 2024-12-10 | End: 2024-12-10

## 2024-12-10 RX ORDER — TRAMADOL HYDROCHLORIDE 50 MG/1
100 TABLET ORAL PRN
Status: DISCONTINUED | OUTPATIENT
Start: 2024-12-10 | End: 2024-12-10 | Stop reason: HOSPADM

## 2024-12-10 RX ORDER — PROPOFOL 10 MG/ML
INJECTION, EMULSION INTRAVENOUS
Status: DISCONTINUED | OUTPATIENT
Start: 2024-12-10 | End: 2024-12-10 | Stop reason: SDUPTHER

## 2024-12-10 RX ORDER — LIDOCAINE HYDROCHLORIDE 20 MG/ML
INJECTION, SOLUTION EPIDURAL; INFILTRATION; INTRACAUDAL; PERINEURAL
Status: DISCONTINUED | OUTPATIENT
Start: 2024-12-10 | End: 2024-12-10 | Stop reason: SDUPTHER

## 2024-12-10 RX ORDER — SODIUM CHLORIDE 9 MG/ML
INJECTION, SOLUTION INTRAVENOUS PRN
Status: DISCONTINUED | OUTPATIENT
Start: 2024-12-10 | End: 2024-12-10 | Stop reason: HOSPADM

## 2024-12-10 RX ORDER — MEPERIDINE HYDROCHLORIDE 25 MG/ML
12.5 INJECTION INTRAMUSCULAR; INTRAVENOUS; SUBCUTANEOUS EVERY 5 MIN PRN
Status: DISCONTINUED | OUTPATIENT
Start: 2024-12-10 | End: 2024-12-10 | Stop reason: HOSPADM

## 2024-12-10 RX ORDER — SODIUM CHLORIDE 0.9 % (FLUSH) 0.9 %
5-40 SYRINGE (ML) INJECTION EVERY 12 HOURS SCHEDULED
Status: DISCONTINUED | OUTPATIENT
Start: 2024-12-10 | End: 2024-12-10 | Stop reason: HOSPADM

## 2024-12-10 RX ORDER — FENTANYL CITRATE 50 UG/ML
INJECTION, SOLUTION INTRAMUSCULAR; INTRAVENOUS
Status: DISCONTINUED | OUTPATIENT
Start: 2024-12-10 | End: 2024-12-10 | Stop reason: SDUPTHER

## 2024-12-10 RX ORDER — ROCURONIUM BROMIDE 10 MG/ML
INJECTION, SOLUTION INTRAVENOUS
Status: DISCONTINUED | OUTPATIENT
Start: 2024-12-10 | End: 2024-12-10 | Stop reason: SDUPTHER

## 2024-12-10 RX ORDER — ONDANSETRON 2 MG/ML
INJECTION INTRAMUSCULAR; INTRAVENOUS
Status: DISCONTINUED | OUTPATIENT
Start: 2024-12-10 | End: 2024-12-10 | Stop reason: SDUPTHER

## 2024-12-10 RX ORDER — OXYCODONE HYDROCHLORIDE 5 MG/1
5 TABLET ORAL ONCE
Status: COMPLETED | OUTPATIENT
Start: 2024-12-10 | End: 2024-12-10

## 2024-12-10 RX ORDER — NALOXONE HYDROCHLORIDE 0.4 MG/ML
INJECTION, SOLUTION INTRAMUSCULAR; INTRAVENOUS; SUBCUTANEOUS PRN
Status: DISCONTINUED | OUTPATIENT
Start: 2024-12-10 | End: 2024-12-10 | Stop reason: HOSPADM

## 2024-12-10 RX ORDER — SODIUM CHLORIDE 0.9 % (FLUSH) 0.9 %
5-40 SYRINGE (ML) INJECTION PRN
Status: DISCONTINUED | OUTPATIENT
Start: 2024-12-10 | End: 2024-12-10 | Stop reason: HOSPADM

## 2024-12-10 RX ORDER — TRAMADOL HYDROCHLORIDE 50 MG/1
50 TABLET ORAL PRN
Status: DISCONTINUED | OUTPATIENT
Start: 2024-12-10 | End: 2024-12-10 | Stop reason: HOSPADM

## 2024-12-10 RX ORDER — OXYCODONE HYDROCHLORIDE 5 MG/1
5 TABLET ORAL EVERY 6 HOURS PRN
Qty: 12 TABLET | Refills: 0 | Status: SHIPPED | OUTPATIENT
Start: 2024-12-10 | End: 2024-12-13

## 2024-12-10 RX ORDER — SODIUM CHLORIDE 9 MG/ML
INJECTION, SOLUTION INTRAVENOUS
Status: DISCONTINUED | OUTPATIENT
Start: 2024-12-10 | End: 2024-12-10 | Stop reason: SDUPTHER

## 2024-12-10 RX ADMIN — ROCURONIUM BROMIDE 40 MG: 10 INJECTION, SOLUTION INTRAVENOUS at 08:21

## 2024-12-10 RX ADMIN — ACETAMINOPHEN 1000 MG: 500 TABLET ORAL at 08:12

## 2024-12-10 RX ADMIN — WATER 2000 MG: 1 INJECTION INTRAMUSCULAR; INTRAVENOUS; SUBCUTANEOUS at 08:29

## 2024-12-10 RX ADMIN — OXYCODONE 5 MG: 5 TABLET ORAL at 10:45

## 2024-12-10 RX ADMIN — PROPOFOL 150 MG: 10 INJECTION, EMULSION INTRAVENOUS at 08:21

## 2024-12-10 RX ADMIN — SUGAMMADEX 200 MG: 100 INJECTION, SOLUTION INTRAVENOUS at 09:18

## 2024-12-10 RX ADMIN — SODIUM CHLORIDE: 9 INJECTION, SOLUTION INTRAVENOUS at 08:14

## 2024-12-10 RX ADMIN — ONDANSETRON 4 MG: 2 INJECTION, SOLUTION INTRAMUSCULAR; INTRAVENOUS at 09:05

## 2024-12-10 RX ADMIN — FENTANYL CITRATE 100 MCG: 50 INJECTION, SOLUTION INTRAMUSCULAR; INTRAVENOUS at 08:21

## 2024-12-10 RX ADMIN — LIDOCAINE HYDROCHLORIDE 60 MG: 20 INJECTION, SOLUTION EPIDURAL; INFILTRATION; INTRACAUDAL; PERINEURAL at 08:21

## 2024-12-10 ASSESSMENT — PAIN DESCRIPTION - PAIN TYPE
TYPE: SURGICAL PAIN

## 2024-12-10 ASSESSMENT — PAIN - FUNCTIONAL ASSESSMENT
PAIN_FUNCTIONAL_ASSESSMENT: NONE - DENIES PAIN
PAIN_FUNCTIONAL_ASSESSMENT: 0-10
PAIN_FUNCTIONAL_ASSESSMENT: 0-10

## 2024-12-10 ASSESSMENT — PAIN DESCRIPTION - DESCRIPTORS
DESCRIPTORS: DISCOMFORT
DESCRIPTORS: ACHING;DISCOMFORT
DESCRIPTORS: DISCOMFORT
DESCRIPTORS: DISCOMFORT
DESCRIPTORS: ACHING;DISCOMFORT;SORE
DESCRIPTORS: ACHING;DISCOMFORT

## 2024-12-10 ASSESSMENT — PAIN DESCRIPTION - LOCATION
LOCATION: ABDOMEN

## 2024-12-10 ASSESSMENT — PAIN SCALES - GENERAL
PAINLEVEL_OUTOF10: 5
PAINLEVEL_OUTOF10: 4
PAINLEVEL_OUTOF10: 0

## 2024-12-10 ASSESSMENT — PAIN DESCRIPTION - ORIENTATION
ORIENTATION: ANTERIOR
ORIENTATION: ANTERIOR
ORIENTATION: MID
ORIENTATION: ANTERIOR

## 2024-12-10 NOTE — OP NOTE
Operative Note      Patient: Kevyn Sánchez  YOB: 1945  MRN: [id number]    Date of Procedure: 12/10/2024    Pre-Op Diagnosis Codes:      * Bilateral inguinal hernia without obstruction or gangrene, recurrence not specified [K40.20]    Post-Op Diagnosis: Same       Procedure(s):  LAPAROSCOPIC ROBOTIC XI ASSISTED BILATERAL INGUINAL HERNIA REPAIR WITH MESH POSSIBLE OPEN    Surgeon(s):  Gary Willoughby MD    Assistant:   Resident: Carly Sanchez MD    Anesthesia: General    Estimated Blood Loss (mL): Minimal    Complications: None    Specimens:   * No specimens in log *    Implants:  Implant Name Type Inv. Item Serial No.  Lot No. LRB No. Used Action   MESH CHAIM [id number] POLY POLYLACTIC ACID 70% CLLGN 30% GLYC - [id number]  MESH CHAIM [id number] POLY POLYLACTIC ACID 70% CLLGN 30% GLYC  [employer] COVID[employer]EN  SURGICAL-WD [id number] N/A 1 Implanted   MESH CHAIM [id number] POLY POLYLACTIC ACID 70% CLLGN 30% GLYC - [id number]  MESH CHAIM [id number] POLY POLYLACTIC ACID 70% CLLGN 30% GLYC  [employer] COVID[employer]EN US SURGICAL-WD [id number] N/A 1 Implanted         Drains:   Urinary Catheter 12/10/24 2 Way (Active)       Findings:  Infection Present At Time Of Surgery (PATOS) (choose all levels that have infection present):  No infection present  Other Findings: R direct hernia, L indirect hernia with large cord lipoma    Detailed Description of Procedure:   After informed consent, the patient was brought to the operating room and placed Supine. SCDs were on and functioning. General anesthesia was then induced which the patient tolerated well. Time out was performed to identify the correct patient and procedure. They received appropriate perioperative antibiotics. The abdomen and genitalia were prepped and draped in the usual sterile fashion.    Pneumoperitoneum was created with Veress in the umbilicus and confirmed via the saline drop test. The abdomen was insufflated to 15mmHg which the patient tolerated

## 2024-12-10 NOTE — ANESTHESIA PRE PROCEDURE
Department of Anesthesiology  Preprocedure Note       Name:  Kevyn Sánchez   Age:  79 y.o.  :  1945                                          MRN:  [id number]         Date:  12/10/2024      Surgeon: Surgeon(s):  Gary Willoughby MD    Procedure: Procedure(s):  LAPAROSCOPIC ROBOTIC XI ASSISTED BILATERAL INGUINAL HERNIA REPAIR WITH MESH POSSIBLE OPEN    Medications prior to admission:   Prior to Admission medications    Medication Sig Start Date End Date Taking? Authorizing Provider   omeprazole (PRILOSEC) 20 MG delayed release capsule Take 1 capsule by mouth daily   Yes Giles Ziegler MD   Multiple Vitamins-Minerals (MULTIVITAMIN ADULTS PO) Take 1 tablet by mouth daily   Yes Giles Ziegler MD   traZODone (DESYREL) 50 MG tablet TAKE 1 TABLET BY MOUTH ONCE DAILY AT BEDTIME 24   Zechariah Oquendo DO   amLODIPine (NORVASC) 10 MG tablet Take 1 tablet by mouth at bedtime 3/14/24   Giles Ziegler MD   apixaban (ELIQUIS) 5 MG TABS tablet Take 1 tablet by mouth 2 times daily 24   Giles Ziegler MD   atorvastatin (LIPITOR) 20 MG tablet Take 1 tablet by mouth daily    Giles Ziegler MD   busPIRone (BUSPAR) 10 MG tablet Take 1 tablet by mouth 3 times daily 24   Giles Ziegler MD   dronedarone hcl (MULTAQ) 400 MG TABS Take 1 tablet by mouth 2 times daily (with meals) 24   Giles Ziegler MD   fluticasone (FLONASE) 50 MCG/ACT nasal spray 2 sprays by Nasal route daily 24   Giles Ziegler MD   metoprolol succinate (TOPROL XL) 100 MG extended release tablet Take 0.5 tablets by mouth daily 24   Giles Ziegler MD   sertraline (ZOLOFT) 50 MG tablet Take 1 tablet by mouth daily 24   Giles Ziegler MD   levothyroxine (SYNTHROID) 25 MCG tablet Take 1 tablet by mouth daily 1 tab po daily except 2 tabs po every   eff: 24   Giles Ziegler MD       Current medications:    Current Facility-Administered

## 2024-12-10 NOTE — INTERVAL H&P NOTE
Update History & Physical    The patient's History and Physical of November 13, 2024 was reviewed with the patient and I examined the patient. There was no change. The surgical site was confirmed by the patient and me.     Plan: The risks, benefits, expected outcome, and alternative to the recommended procedure have been discussed with the patient. Patient understands and wants to proceed with the procedure.     Electronically signed by Gary Willoughby MD on 12/10/2024 at 7:28 AM

## 2024-12-10 NOTE — ANESTHESIA POSTPROCEDURE EVALUATION
Department of Anesthesiology  Postprocedure Note    Patient: Kevyn Sánchez  MRN: [id number]  YOB: 1945  Date of evaluation: 12/10/2024    Procedure Summary       Date: 12/10/24 Room / Location: [address]    Anesthesia Start: 0814 Anesthesia Stop: 0925    Procedure: LAPAROSCOPIC ROBOTIC XI ASSISTED BILATERAL INGUINAL HERNIA REPAIR WITH MESH POSSIBLE OPEN (Bilateral) Diagnosis:       Bilateral inguinal hernia without obstruction or gangrene, recurrence not specified      (Bilateral inguinal hernia without obstruction or gangrene, recurrence not specified [K40.20])    Surgeons: Gary Willoughby MD Responsible Provider: Usman Beaver MD    Anesthesia Type: general ASA Status: 3            Anesthesia Type: No value filed.    Therese Phase I: Therese Score: 10    Therese Phase II:      Anesthesia Post Evaluation    Patient location during evaluation: PACU  Patient participation: complete - patient participated  Level of consciousness: awake  Airway patency: patent  Nausea & Vomiting: no nausea and no vomiting  Cardiovascular status: hemodynamically stable  Respiratory status: acceptable  Hydration status: euvolemic  Pain management: adequate        No notable events documented.

## 2024-12-27 ENCOUNTER — HOSPITAL ENCOUNTER (OUTPATIENT)
Dept: RADIOLOGY | Facility: HOSPITAL | Age: 79
Discharge: HOME | End: 2024-12-27
Payer: MEDICARE

## 2024-12-27 DIAGNOSIS — R91.1 SOLITARY LUNG NODULE: ICD-10-CM

## 2024-12-27 DIAGNOSIS — J84.9 INTERSTITIAL LUNG DISEASE (MULTI): ICD-10-CM

## 2024-12-27 PROCEDURE — 71250 CT THORAX DX C-: CPT

## 2025-01-20 ENCOUNTER — APPOINTMENT (OUTPATIENT)
Dept: PULMONOLOGY | Facility: HOSPITAL | Age: 80
End: 2025-01-20
Payer: MEDICARE

## 2025-01-27 ENCOUNTER — OFFICE VISIT (OUTPATIENT)
Dept: PULMONOLOGY | Facility: HOSPITAL | Age: 80
End: 2025-01-27
Payer: MEDICARE

## 2025-01-27 VITALS
BODY MASS INDEX: 27.97 KG/M2 | HEART RATE: 57 BPM | TEMPERATURE: 96.8 F | WEIGHT: 174 LBS | OXYGEN SATURATION: 96 % | RESPIRATION RATE: 16 BRPM | HEIGHT: 66 IN | SYSTOLIC BLOOD PRESSURE: 136 MMHG | DIASTOLIC BLOOD PRESSURE: 78 MMHG

## 2025-01-27 DIAGNOSIS — I25.10 CORONARY ARTERY CALCIFICATION: ICD-10-CM

## 2025-01-27 DIAGNOSIS — R91.1 SOLITARY LUNG NODULE: Primary | ICD-10-CM

## 2025-01-27 DIAGNOSIS — J30.9 ALLERGIC RHINITIS, UNSPECIFIED SEASONALITY, UNSPECIFIED TRIGGER: ICD-10-CM

## 2025-01-27 DIAGNOSIS — G47.33 OSA ON CPAP: ICD-10-CM

## 2025-01-27 DIAGNOSIS — J84.9 INTERSTITIAL LUNG DISEASE (MULTI): ICD-10-CM

## 2025-01-27 DIAGNOSIS — Z87.891 FORMER SMOKER: ICD-10-CM

## 2025-01-27 PROCEDURE — 1036F TOBACCO NON-USER: CPT | Performed by: INTERNAL MEDICINE

## 2025-01-27 PROCEDURE — 1159F MED LIST DOCD IN RCRD: CPT | Performed by: INTERNAL MEDICINE

## 2025-01-27 PROCEDURE — 99214 OFFICE O/P EST MOD 30 MIN: CPT | Performed by: INTERNAL MEDICINE

## 2025-01-27 ASSESSMENT — ENCOUNTER SYMPTOMS
FATIGUE: 0
FEVER: 0
CHILLS: 0
UNEXPECTED WEIGHT CHANGE: 0
COUGH: 0
RHINORRHEA: 0
WHEEZING: 0
SHORTNESS OF BREATH: 1

## 2025-01-27 NOTE — PROGRESS NOTES
Subjective   Patient ID: Alin Kessler is a 79 y.o. male who presents for Follow up for pulmonary fibrosis.     HPI: Patient has PMH of CAD, depression, HTN, HLD, ALEX, and atrial fibrillation. He is here for a second opinion. His wife and son are present with him today. He states that he was getting preoperative clearance for an ablation and he was told he had pulmonary fibrosis. He had one CT chest done in September 2023. He then saw a pulmonologist that told him he had mild idiopathic pulmonary fibrosis and that he needed to enjoy life, wife reports they told him he had about 3-5 years life expectancy. He is not on any oxygen. He states that he has shortness of breath but this has improved after having the ablation. He has an occasional productive cough and sinus drainage. He denies any history of CTD. He is a former smoker of only 10 years, quit 40 years ago and smoked 1 ppd. He has a history of working in factories. He has ALEX and is on CPAP. His sister had pulmonary fibrosis also. He climbs up 14 steps to go his house without shortness of breath. He still has mild cough related to postnasal drip.  Wife states that he is a couch potato.     He is here for follow up accompanied by his wife. Pt states he is doing good without any progression of his SOB or cough. He has SOB only with strenuous activity and cough is only intermittent. 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 He wears his CPAP nightly and has controlled daytime symptoms and good sleep quality but has not worn since November 2024.     Review of Systems   Constitutional:  Negative for chills, fatigue, fever and unexpected weight change.   HENT:  Negative for congestion, postnasal drip and rhinorrhea.    Respiratory:  Positive for shortness of breath. Negative for cough (denies hemoptysis.) and wheezing.    Cardiovascular:  Negative for chest pain and leg swelling.   All other systems reviewed and  "are negative.      Objective   Physical Exam  Vitals reviewed.   Constitutional:       Appearance: Normal appearance.   HENT:      Head: Normocephalic.   Cardiovascular:      Rate and Rhythm: Normal rate and regular rhythm.   Pulmonary:      Effort: Pulmonary effort is normal.      Breath sounds: Rales present.   Skin:     General: Skin is warm and dry.   Neurological:      Mental Status: He is alert.       Assessment/Plan   Pulmonary fibrosis/ILD  Former light smoker  ALEX, on CPAP  Allergic rhinitis  Anemia  Lung nodule 7 mm lingula    Plan:    Patient is here for a second opinion for pulmonary fibrosis. He states prior pulmonologist diagnosed him with idiopathic pulmonary fibrosis and stated his life expectancy was 3-5 years. He brought his PFT results today which showed no restriction and no obstruction, his DLCO was reduced at 39. He had one CT chest that was done September 2023, the report states mild bronchiectasis in lower lungs and sub linear fibrotic changes without. He is short of breath on exertion but states this has improved after having his ablation. He is not on any oxygen, he is 96% on RA.    -HRCT repeat discussed from June 2024 with subpleural reticulations and traction bronchiectasis and no honeycombing \"probable UIP' and 7 mm lingular nodule. CT chest Dec 27, 24 without changes. Repeat CT chest in 6 months.   -PFTs from April 2024 with no obstruction or restriction, TLC was 96, DLCO 39. Discussed results at length.   -CTD, HP panel, IGE/RAST neg. RA factor and YARED + but ccp neg and YARED positive with DSDNA neg.   -He is a former smoker at 1 ppd x 10 years, quit 40 years ago. No obstruction on PFTs.  -6MWT done today in office, he did not qualify for supplemental oxygen he was at his lowest 90% on RA.  -He has ALEX and was using CPAP nightly. He states he stopped using November 2024 but he sleeps better and wife does not notice snoring. He still has EDS and fatigue though; states he is not sure if " he felt improvement in his energy levels and alertness during day. [facility]. Pt states that he would rather start using it again and bring PAP for me to review in Summer of 2025.  -Recommend Loratadine and Flonase prn. He is using flonase and notes improvement.  -Hb in 2018 around 8 g. Recommend obtain repeat test results from PCP.   -Recommend cardiology consultation for coronary artery calcifications through PCP.     Overall I discussed at length that with current testing I have currently this does not appear to be a progressive ILD. D/d includes postinflammatory pulmonary fibrosis. His breathing has actually improved since ablation, he does not qualify for oxygen, and his PFTs have no restriction. His CTD and HP panel work up was practically negative. He is compliant with his CPAP. His repeat CT chest again does not show change in ILD and lung nodule. We concluded our discussion with recommendation of repeating CT chest in 6 months for both ILD and lung nodule and in the meanwhile he will remain active with a regular physical activity regimen. If he has any worsening prior to next OV pt and his family will contact us earlier than scheduled visit.   Pain Refusal Text: I offered to prescribe pain medication but the patient refused to take this medication.

## 2025-01-28 ENCOUNTER — TELEPHONE (OUTPATIENT)
Age: 80
End: 2025-01-28

## 2025-01-28 ENCOUNTER — TELEPHONE (OUTPATIENT)
Dept: PULMONOLOGY | Facility: HOSPITAL | Age: 80
End: 2025-01-28
Payer: MEDICARE

## 2025-01-28 DIAGNOSIS — I25.10 CORONARY ARTERY DISEASE INVOLVING NATIVE HEART, UNSPECIFIED VESSEL OR LESION TYPE, UNSPECIFIED WHETHER ANGINA PRESENT: Primary | ICD-10-CM

## 2025-01-28 NOTE — TELEPHONE ENCOUNTER
Called patients wife. They state they already see cardiology and are going to make a follow up appointment to see him. Notes and CT scan sent to PCP per Dr. Rojas request. PCP Verified. Patients wife acknowledged understanding. All questions answered at this time.     ----- Message from Amilcar Rojas sent at 1/27/2025  2:55 PM EST -----  Can you please notify patient that I went ahead and made a cardiology referral due to coronary artery calcifications just to get evaluated?

## 2025-02-10 ENCOUNTER — TELEPHONE (OUTPATIENT)
Age: 80
End: 2025-02-10

## 2025-02-10 NOTE — TELEPHONE ENCOUNTER
Maritza called, Dr Mathew Godinez has never called him from 1/28 to make an appointment. You referred their son to Dr Chapo Wright and they actually would prefer you refer him to Dr Wright instead. Maritza talked to the  there and they just need a referral from you to schedule him.

## 2025-03-07 ENCOUNTER — TELEPHONE (OUTPATIENT)
Dept: ADMINISTRATIVE | Age: 80
End: 2025-03-07

## 2025-03-07 NOTE — TELEPHONE ENCOUNTER
Patient Appointment Form:      PCP: Fidel  Referring: Fidel    Has the Patient:    Seen a Cardiologist? yes    date:11/2025  Physician:Jasmyne  location:[place]    Had a heart catheterization? no    Had heart surgery? no    Had a stress test or nuclear stress test? other: all med recs media    Had an echocardiogram? no    Had a vascular ultrasound? no    Had a 24/48 heart monitor or extended cardiac event monitor? no    Had recent blood work in the last 6 months?  Yes , 10/2024 Fidel    Had a pacemaker/ICD/ILR implant? no    Seen an Electrophysiologist? no        Will send records via: in Epic      Date & time of appointment:  3/31/2025 Dr Adriana Pichardo Pt has Cardio loop-

## 2025-03-10 ENCOUNTER — HOSPITAL ENCOUNTER (EMERGENCY)
Age: 80
Discharge: HOME | End: 2025-03-10
Attending: EMERGENCY MEDICINE
Payer: MEDICARE

## 2025-03-10 ENCOUNTER — TELEPHONE (OUTPATIENT)
Age: 80
End: 2025-03-10

## 2025-03-10 ENCOUNTER — APPOINTMENT (OUTPATIENT)
Dept: CT IMAGING | Age: 80
End: 2025-03-10
Payer: MEDICARE

## 2025-03-10 VITALS
RESPIRATION RATE: 18 BRPM | OXYGEN SATURATION: 99 % | BODY MASS INDEX: 26.63 KG/M2 | HEART RATE: 62 BPM | WEIGHT: 170 LBS | DIASTOLIC BLOOD PRESSURE: 72 MMHG | TEMPERATURE: 97.6 F | SYSTOLIC BLOOD PRESSURE: 140 MMHG

## 2025-03-10 DIAGNOSIS — N39.0 URINARY TRACT INFECTION WITHOUT HEMATURIA, SITE UNSPECIFIED: Primary | ICD-10-CM

## 2025-03-10 LAB
ANION GAP SERPL CALCULATED.3IONS-SCNC: 13 MMOL/L (ref 7–16)
BACTERIA URNS QL MICRO: ABNORMAL
BASOPHILS # BLD: 0.04 K/UL (ref 0–0.2)
BASOPHILS NFR BLD: 0 % (ref 0–2)
BILIRUB UR QL STRIP: NEGATIVE
BUN SERPL-MCNC: 12 MG/DL (ref 6–23)
CALCIUM SERPL-MCNC: 8.7 MG/DL (ref 8.6–10.2)
CHLORIDE SERPL-SCNC: 108 MMOL/L (ref 98–107)
CLARITY UR: CLEAR
CO2 SERPL-SCNC: 20 MMOL/L (ref 22–29)
COLOR UR: YELLOW
CREAT SERPL-MCNC: 1.3 MG/DL (ref 0.7–1.2)
EOSINOPHIL # BLD: 0.23 K/UL (ref 0.05–0.5)
EOSINOPHILS RELATIVE PERCENT: 2 % (ref 0–6)
ERYTHROCYTE [DISTWIDTH] IN BLOOD BY AUTOMATED COUNT: 16.9 % (ref 11.5–15)
GFR, ESTIMATED: 56 ML/MIN/1.73M2
GLUCOSE BLD-MCNC: 104 MG/DL (ref 74–99)
GLUCOSE SERPL-MCNC: 96 MG/DL (ref 74–99)
GLUCOSE UR STRIP-MCNC: NEGATIVE MG/DL
HCT VFR BLD AUTO: 35.4 % (ref 37–54)
HGB BLD-MCNC: 11.4 G/DL (ref 12.5–16.5)
HGB UR QL STRIP.AUTO: ABNORMAL
IMM GRANULOCYTES # BLD AUTO: 0.05 K/UL (ref 0–0.58)
IMM GRANULOCYTES NFR BLD: 1 % (ref 0–5)
KETONES UR STRIP-MCNC: NEGATIVE MG/DL
LEUKOCYTE ESTERASE UR QL STRIP: ABNORMAL
LYMPHOCYTES NFR BLD: 1.12 K/UL (ref 1.5–4)
LYMPHOCYTES RELATIVE PERCENT: 12 % (ref 20–42)
MCH RBC QN AUTO: 25.5 PG (ref 26–35)
MCHC RBC AUTO-ENTMCNC: 32.2 G/DL (ref 32–34.5)
MCV RBC AUTO: 79.2 FL (ref 80–99.9)
MONOCYTES NFR BLD: 0.65 K/UL (ref 0.1–0.95)
MONOCYTES NFR BLD: 7 % (ref 2–12)
NEUTROPHILS NFR BLD: 78 % (ref 43–80)
NEUTS SEG NFR BLD: 7.36 K/UL (ref 1.8–7.3)
NITRITE UR QL STRIP: NEGATIVE
PH UR STRIP: 6 [PH] (ref 5–8)
PLATELET # BLD AUTO: 180 K/UL (ref 130–450)
PMV BLD AUTO: 10.7 FL (ref 7–12)
POTASSIUM SERPL-SCNC: 3.9 MMOL/L (ref 3.5–5)
PROT UR STRIP-MCNC: ABNORMAL MG/DL
RBC # BLD AUTO: 4.47 M/UL (ref 3.8–5.8)
RBC #/AREA URNS HPF: ABNORMAL /HPF
SODIUM SERPL-SCNC: 141 MMOL/L (ref 132–146)
SP GR UR STRIP: 1.02 (ref 1–1.03)
UROBILINOGEN UR STRIP-ACNC: 0.2 EU/DL (ref 0–1)
WBC #/AREA URNS HPF: ABNORMAL /HPF
WBC OTHER # BLD: 9.5 K/UL (ref 4.5–11.5)

## 2025-03-10 PROCEDURE — 99284 EMERGENCY DEPT VISIT MOD MDM: CPT

## 2025-03-10 PROCEDURE — 85025 COMPLETE CBC W/AUTO DIFF WBC: CPT

## 2025-03-10 PROCEDURE — 81001 URINALYSIS AUTO W/SCOPE: CPT

## 2025-03-10 PROCEDURE — 87077 CULTURE AEROBIC IDENTIFY: CPT

## 2025-03-10 PROCEDURE — 87086 URINE CULTURE/COLONY COUNT: CPT

## 2025-03-10 PROCEDURE — 6370000000 HC RX 637 (ALT 250 FOR IP)

## 2025-03-10 PROCEDURE — 70450 CT HEAD/BRAIN W/O DYE: CPT

## 2025-03-10 PROCEDURE — 93005 ELECTROCARDIOGRAM TRACING: CPT

## 2025-03-10 PROCEDURE — 80048 BASIC METABOLIC PNL TOTAL CA: CPT

## 2025-03-10 PROCEDURE — 82962 GLUCOSE BLOOD TEST: CPT

## 2025-03-10 RX ORDER — CEFDINIR 300 MG/1
300 CAPSULE ORAL 2 TIMES DAILY
Qty: 14 CAPSULE | Refills: 0 | Status: SHIPPED | OUTPATIENT
Start: 2025-03-10 | End: 2025-03-17

## 2025-03-10 RX ORDER — CEFDINIR 300 MG/1
300 CAPSULE ORAL ONCE
Status: COMPLETED | OUTPATIENT
Start: 2025-03-10 | End: 2025-03-10

## 2025-03-10 RX ADMIN — CEFDINIR 300 MG: 300 CAPSULE ORAL at 18:48

## 2025-03-10 ASSESSMENT — LIFESTYLE VARIABLES
HOW OFTEN DO YOU HAVE A DRINK CONTAINING ALCOHOL: NEVER
HOW MANY STANDARD DRINKS CONTAINING ALCOHOL DO YOU HAVE ON A TYPICAL DAY: PATIENT DOES NOT DRINK

## 2025-03-10 NOTE — ED PROVIDER NOTES
capsule Take 1 capsule by mouth 2 times daily for 7 days, Disp-14 capsule, R-0Normal             DISCONTINUED MEDICATIONS:  Discharge Medication List as of 3/10/2025  6:57 PM                 (Please note that portions of this note were completed with a voice recognition program.  Efforts were made to edit the dictations but occasionally words are mis-transcribed.)    Fernando Colin,  (electronically signed)

## 2025-03-10 NOTE — DISCHARGE INSTRUCTIONS
Please call and schedule appointment with your primary care doctor.  Return to the ER if your symptoms should worsen.

## 2025-03-10 NOTE — TELEPHONE ENCOUNTER
Patient's wife called because on Saturday (3/8) patient had an episode where he had \" a total stare\" for a couple of minutes, couldn't see at all and started to take off his pajamas for no reason. Afterwards he was ok, oriented to place ( they were in [facility]) and he knew who she was.  She would like him to have a CT scan. She believes it was a TIA. She had asked for an appointment today. He also had tripped on his sock and scraped his arm from mid arm to elbow. They put antibiotic ointment on it & wrapped it but she would like you to look at that also. When do you want to see him?

## 2025-03-10 NOTE — ED NOTES
Department of Emergency Medicine  FIRST PROVIDER TRIAGE NOTE             Independent MLP           3/10/25  3:38 PM EDT    Date of Encounter: 3/10/25   MRN: [id number]      HPI: Kevyn Sánchez is a 79 y.o. male who presents to the ED for Altered Mental Status (Pt flew in from [place] about 0900 today, had an episode of walking outside on deck and started taking clothes off and no vision for 5 minutes was on saturdanot sure if he was sleeping, gcs 15 in triage, no pain, no nausea, no headache, left facial droop, also fell on Friday/tripped left forearm lac?)       ROS: Negative for fever.    PE: Gen Appearance/Constitutional: alert  Musculoskeletal: moves all extremities x 4    Initial Plan of Care: All treatment areas with department are currently occupied.      Plan to order/Initiate the following while awaiting opening in ED: Triage evaluation  .     Provider-Patient relationship only established for Provider In Triage (PIT).  Full assessment, HPI and examination not performed, therefore, it is not yet possible to state whether or not an emergency medical condition exists     Initial Plan of Care: Initiate Treatment-Testing, Proceed toTreatment Area When Bed Available for ED Attending/MLP to Continue Care  Secondary to high volume, low staffing, and/or boarding- patient to await bed availability.     This ends my PIT-Patient relationship.  Care of patient relinquished after triage         Electronically signed by ERIKA Correia CNP   DD: 3/10/25

## 2025-03-11 ENCOUNTER — TELEPHONE (OUTPATIENT)
Age: 80
End: 2025-03-11

## 2025-03-11 LAB
ALBUMIN: NORMAL
ALP BLD-CCNC: NORMAL U/L
ALT SERPL-CCNC: NORMAL U/L
AST SERPL-CCNC: NORMAL U/L
BASOPHILS ABSOLUTE: NORMAL
BASOPHILS RELATIVE PERCENT: NORMAL
BILIRUB SERPL-MCNC: NORMAL MG/DL
BUN BLDV-MCNC: NORMAL MG/DL
CALCIUM SERPL-MCNC: NORMAL MG/DL
CHLORIDE BLD-SCNC: NORMAL MMOL/L
CO2: NORMAL
CREAT SERPL-MCNC: NORMAL MG/DL
EOSINOPHILS ABSOLUTE: NORMAL
EOSINOPHILS RELATIVE PERCENT: NORMAL
GFR, ESTIMATED: NORMAL
GLUCOSE FASTING: NORMAL
HCT VFR BLD CALC: NORMAL %
HEMOGLOBIN: NORMAL
LYMPHOCYTES ABSOLUTE: NORMAL
LYMPHOCYTES RELATIVE PERCENT: NORMAL
MCH RBC QN AUTO: NORMAL PG
MCHC RBC AUTO-ENTMCNC: NORMAL G/DL
MCV RBC AUTO: NORMAL FL
MONOCYTES ABSOLUTE: NORMAL
MONOCYTES RELATIVE PERCENT: NORMAL
NEUTROPHILS ABSOLUTE: NORMAL
NEUTROPHILS RELATIVE PERCENT: NORMAL
PDW BLD-RTO: NORMAL %
PLATELET # BLD: NORMAL 10*3/UL
PMV BLD AUTO: NORMAL FL
POTASSIUM SERPL-SCNC: NORMAL MMOL/L
RBC # BLD: NORMAL 10*6/UL
SODIUM BLD-SCNC: NORMAL MMOL/L
T4 FREE: NORMAL
TOTAL PROTEIN: NORMAL
TSH SERPL DL<=0.05 MIU/L-ACNC: NORMAL M[IU]/L
WBC # BLD: NORMAL 10*3/UL

## 2025-03-11 NOTE — TELEPHONE ENCOUNTER
Patient's wife called to schedule him an ER Follow up appointment. How soon do you need to see him?

## 2025-03-12 ENCOUNTER — RESULTS FOLLOW-UP (OUTPATIENT)
Dept: EMERGENCY DEPT | Age: 80
End: 2025-03-12

## 2025-03-12 LAB
EKG ATRIAL RATE: 59 BPM
EKG P-R INTERVAL: 344 MS
EKG Q-T INTERVAL: 496 MS
EKG QRS DURATION: 90 MS
EKG QTC CALCULATION (BAZETT): 491 MS
EKG R AXIS: -2 DEGREES
EKG VENTRICULAR RATE: 59 BPM

## 2025-03-12 PROCEDURE — 93010 ELECTROCARDIOGRAM REPORT: CPT | Performed by: INTERNAL MEDICINE

## 2025-03-13 LAB
MICROORGANISM SPEC CULT: ABNORMAL
SERVICE CMNT-IMP: ABNORMAL
SPECIMEN DESCRIPTION: ABNORMAL

## 2025-03-13 RX ORDER — AMOXICILLIN 875 MG/1
875 TABLET, COATED ORAL 2 TIMES DAILY
Qty: 14 TABLET | Refills: 0 | Status: SHIPPED | OUTPATIENT
Start: 2025-03-13 | End: 2025-03-20

## 2025-03-13 NOTE — ED NOTES
Reviewed patients after hours culture results with Dr. Rose.  Urine culture growing ENTEROCOCCUS FAECALIS, patient discharged on cefdinir, recommend changing antibiotic to amoxicillin.   Left VM with callback instructions.     Jillian Muniz PharmD, BCPS 3/13/2025 11:49 AM   [phone]     Addendum:    Spoke with patient's wife, reviewed results, confirmed medication allergies.  Will send Rx for amoxicillin to the pharmacy, counseled to stop cefdinir when amoxicillin is started.     Jillian Muniz PharmD, BCPS 3/13/2025 2:26 PM   [phone]

## 2025-03-14 ENCOUNTER — OFFICE VISIT (OUTPATIENT)
Age: 80
End: 2025-03-14
Payer: MEDICARE

## 2025-03-14 VITALS
WEIGHT: 169.4 LBS | RESPIRATION RATE: 16 BRPM | HEIGHT: 67 IN | SYSTOLIC BLOOD PRESSURE: 121 MMHG | TEMPERATURE: 97.7 F | DIASTOLIC BLOOD PRESSURE: 55 MMHG | HEART RATE: 47 BPM | BODY MASS INDEX: 26.59 KG/M2 | OXYGEN SATURATION: 98 %

## 2025-03-14 DIAGNOSIS — I48.0 PAROXYSMAL A-FIB (HCC): ICD-10-CM

## 2025-03-14 DIAGNOSIS — G45.9 TRANSIENT ISCHEMIC ATTACK: ICD-10-CM

## 2025-03-14 DIAGNOSIS — E03.9 PRIMARY HYPOTHYROIDISM: ICD-10-CM

## 2025-03-14 DIAGNOSIS — I10 BENIGN HYPERTENSION: ICD-10-CM

## 2025-03-14 DIAGNOSIS — I25.10 CORONARY ARTERY DISEASE INVOLVING NATIVE CORONARY ARTERY OF NATIVE HEART WITHOUT ANGINA PECTORIS: ICD-10-CM

## 2025-03-14 DIAGNOSIS — N30.00 ACUTE CYSTITIS WITHOUT HEMATURIA: Primary | ICD-10-CM

## 2025-03-14 DIAGNOSIS — E78.2 MIXED HYPERLIPIDEMIA: ICD-10-CM

## 2025-03-14 PROCEDURE — 99214 OFFICE O/P EST MOD 30 MIN: CPT | Performed by: FAMILY MEDICINE

## 2025-03-14 PROCEDURE — 1159F MED LIST DOCD IN RCRD: CPT | Performed by: FAMILY MEDICINE

## 2025-03-14 PROCEDURE — 3074F SYST BP LT 130 MM HG: CPT | Performed by: FAMILY MEDICINE

## 2025-03-14 PROCEDURE — 1123F ACP DISCUSS/DSCN MKR DOCD: CPT | Performed by: FAMILY MEDICINE

## 2025-03-14 PROCEDURE — 3078F DIAST BP <80 MM HG: CPT | Performed by: FAMILY MEDICINE

## 2025-03-14 PROCEDURE — 1126F AMNT PAIN NOTED NONE PRSNT: CPT | Performed by: FAMILY MEDICINE

## 2025-03-14 PROCEDURE — 1160F RVW MEDS BY RX/DR IN RCRD: CPT | Performed by: FAMILY MEDICINE

## 2025-03-14 SDOH — ECONOMIC STABILITY: FOOD INSECURITY: WITHIN THE PAST 12 MONTHS, YOU WORRIED THAT YOUR FOOD WOULD RUN OUT BEFORE YOU GOT MONEY TO BUY MORE.: NEVER TRUE

## 2025-03-14 SDOH — ECONOMIC STABILITY: FOOD INSECURITY: WITHIN THE PAST 12 MONTHS, THE FOOD YOU BOUGHT JUST DIDN'T LAST AND YOU DIDN'T HAVE MONEY TO GET MORE.: NEVER TRUE

## 2025-03-14 ASSESSMENT — ENCOUNTER SYMPTOMS
GASTROINTESTINAL NEGATIVE: 1
FACIAL SWELLING: 0
RECTAL PAIN: 0
RESPIRATORY NEGATIVE: 1
SINUS PAIN: 0
ALLERGIC/IMMUNOLOGIC NEGATIVE: 1
EYES NEGATIVE: 1

## 2025-03-14 ASSESSMENT — PATIENT HEALTH QUESTIONNAIRE - PHQ9
1. LITTLE INTEREST OR PLEASURE IN DOING THINGS: NOT AT ALL
SUM OF ALL RESPONSES TO PHQ QUESTIONS 1-9: 0
2. FEELING DOWN, DEPRESSED OR HOPELESS: NOT AT ALL
SUM OF ALL RESPONSES TO PHQ QUESTIONS 1-9: 0

## 2025-03-14 NOTE — PROGRESS NOTES
Kevyn Sánchez (:  1945) is a 79 y.o. male,Established patient, here for evaluation of the following chief complaint(s):  Follow-up (Pt was in [facility] ED on 3/10/25.  Pt was diagnosed with UTI, altered mental status.  Pt is currently taking amoxicillin.  ), Hypertension ([facility] did [facility]), Hypothyroidism, and Hyperlipidemia         Assessment & Plan  Acute cystitis without hematuria   Chronic, at goal (stable), continue current treatment plan, medication adherence emphasized, and lifestyle modifications recommended    Orders:    Culture, Urine; Future    Urinalysis with Microscopic; Future    Benign hypertension   Chronic, at goal (stable), continue current treatment plan, medication adherence emphasized, and lifestyle modifications recommended         Coronary artery disease involving native coronary artery of native heart without angina pectoris   Chronic, at goal (stable), continue current treatment plan, medication adherence emphasized, and lifestyle modifications recommended         Paroxysmal A-fib (HCC)   Chronic, at goal (stable), continue current treatment plan, medication adherence emphasized, and lifestyle modifications recommended         Primary hypothyroidism   Chronic, at goal (stable), continue current treatment plan, medication adherence emphasized, and lifestyle modifications recommended         Mixed hyperlipidemia   Chronic, at goal (stable), continue current treatment plan, medication adherence emphasized, and lifestyle modifications recommended         Transient ischemic attack   Chronic, at goal (stable), continue current treatment plan at present the patient is having no issues with the TIA he is symptomatically doing well I will follow him up during his regular scheduled visit in a he will get a urine specimen neck           No follow-ups on file.     Follow-up I reviewed his hospital stay notes we will follow-up a urine culture and sensitivity next

## 2025-03-14 NOTE — ASSESSMENT & PLAN NOTE
Chronic, at goal (stable), continue current treatment plan, medication adherence emphasized, and lifestyle modifications recommended    Orders:    Culture, Urine; Future    Urinalysis with Microscopic; Future

## 2025-03-21 DIAGNOSIS — N30.00 ACUTE CYSTITIS WITHOUT HEMATURIA: ICD-10-CM

## 2025-03-21 LAB
BILIRUBIN, URINE: NEGATIVE
COLOR, UA: YELLOW
CRYSTALS, UA: ABNORMAL /HPF
GLUCOSE URINE: NEGATIVE MG/DL
KETONES, URINE: NEGATIVE MG/DL
LEUKOCYTE ESTERASE, URINE: NEGATIVE
NITRITE, URINE: NEGATIVE
PH, URINE: 6 (ref 5–8)
PROTEIN UA: NEGATIVE MG/DL
RBC UA: ABNORMAL /HPF
SPECIFIC GRAVITY UA: 1.02 (ref 1–1.03)
TURBIDITY: CLEAR
URINE HGB: NEGATIVE
UROBILINOGEN, URINE: 0.2 EU/DL (ref 0–1)
WBC UA: ABNORMAL /HPF

## 2025-03-22 ENCOUNTER — RESULTS FOLLOW-UP (OUTPATIENT)
Age: 80
End: 2025-03-22

## 2025-03-22 LAB
CULTURE: NO GROWTH
SPECIMEN DESCRIPTION: NORMAL

## 2025-03-31 ENCOUNTER — OFFICE VISIT (OUTPATIENT)
Dept: CARDIOLOGY CLINIC | Age: 80
End: 2025-03-31

## 2025-03-31 ENCOUNTER — RESULTS FOLLOW-UP (OUTPATIENT)
Dept: CARDIOLOGY CLINIC | Age: 80
End: 2025-03-31

## 2025-03-31 VITALS
DIASTOLIC BLOOD PRESSURE: 66 MMHG | OXYGEN SATURATION: 96 % | HEIGHT: 67 IN | HEART RATE: 65 BPM | BODY MASS INDEX: 26.21 KG/M2 | WEIGHT: 167 LBS | TEMPERATURE: 96.9 F | RESPIRATION RATE: 18 BRPM | SYSTOLIC BLOOD PRESSURE: 136 MMHG

## 2025-03-31 DIAGNOSIS — I25.10 CORONARY ARTERY DISEASE INVOLVING NATIVE CORONARY ARTERY OF NATIVE HEART WITHOUT ANGINA PECTORIS: Primary | ICD-10-CM

## 2025-03-31 DIAGNOSIS — Z79.01 CHRONIC ANTICOAGULATION: ICD-10-CM

## 2025-03-31 DIAGNOSIS — Z95.1 HX OF CABG: ICD-10-CM

## 2025-03-31 DIAGNOSIS — I10 PRIMARY HYPERTENSION: ICD-10-CM

## 2025-03-31 DIAGNOSIS — I48.0 PAROXYSMAL A-FIB (HCC): ICD-10-CM

## 2025-03-31 NOTE — PROGRESS NOTES
OUTPATIENT CARDIOLOGY CONSULT    Name: Kevyn Sánchez    Age: 79 y.o.    Date of Service: 4/6/2025    Reason for Consultation: CAD, AF    Referring Physician: Zechariah Oquendo DO    History of Present Illness:  Kevyn Sánchez is a 79 y.o. male who presents today for further evaluation of CAD and AF. He was previously followed by Dr. Yo Lucia. His PMH is outlined in detail below (see A/P below). +chronic LEO with moderate levels of exertion (symptoms worse when in AF). +gait imbalance (he ambulates with a cane, +history of visual loss). He denies recent chest pain, palpitations, lightheadedness, dizziness, or syncope. No history of PND or orthopnea. He is currently with no active cardiac complaints at rest. SB on EKG.    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 OR    INSERTABLE

## 2025-04-01 DIAGNOSIS — I48.0 PAROXYSMAL A-FIB (HCC): ICD-10-CM

## 2025-04-04 ENCOUNTER — TELEPHONE (OUTPATIENT)
Dept: CARDIOLOGY CLINIC | Age: 80
End: 2025-04-04

## 2025-04-04 NOTE — TELEPHONE ENCOUNTER
Prescription was faxed, we will refax today.  Office note  is not signed yet and will be faxed once it is done.

## 2025-04-04 NOTE — TELEPHONE ENCOUNTER
Pt wife/Jenni garnettnd - RX for the Multaq [facility] [phone] -they have never received it.  Jenni also stated that [facility] Couch @ [place] needs a copy of last ofc note with Dr Wright faxed to them at [phone].  Please call Jenni once the RX & ofc notes are faxed [phone]

## 2025-04-07 DIAGNOSIS — E78.2 MIXED HYPERLIPIDEMIA: ICD-10-CM

## 2025-04-07 DIAGNOSIS — E03.9 PRIMARY HYPOTHYROIDISM: ICD-10-CM

## 2025-04-07 DIAGNOSIS — I10 BENIGN HYPERTENSION: ICD-10-CM

## 2025-04-07 SDOH — HEALTH STABILITY: PHYSICAL HEALTH: ON AVERAGE, HOW MANY DAYS PER WEEK DO YOU ENGAGE IN MODERATE TO STRENUOUS EXERCISE (LIKE A BRISK WALK)?: 0 DAYS

## 2025-04-07 ASSESSMENT — LIFESTYLE VARIABLES
HOW MANY STANDARD DRINKS CONTAINING ALCOHOL DO YOU HAVE ON A TYPICAL DAY: PATIENT DOES NOT DRINK
HOW OFTEN DO YOU HAVE SIX OR MORE DRINKS ON ONE OCCASION: 1
HOW MANY STANDARD DRINKS CONTAINING ALCOHOL DO YOU HAVE ON A TYPICAL DAY: 0
HOW OFTEN DO YOU HAVE A DRINK CONTAINING ALCOHOL: 1
HOW OFTEN DO YOU HAVE A DRINK CONTAINING ALCOHOL: NEVER

## 2025-04-07 ASSESSMENT — PATIENT HEALTH QUESTIONNAIRE - PHQ9
SUM OF ALL RESPONSES TO PHQ QUESTIONS 1-9: 0
1. LITTLE INTEREST OR PLEASURE IN DOING THINGS: NOT AT ALL
SUM OF ALL RESPONSES TO PHQ QUESTIONS 1-9: 0
SUM OF ALL RESPONSES TO PHQ QUESTIONS 1-9: 0
2. FEELING DOWN, DEPRESSED OR HOPELESS: NOT AT ALL
SUM OF ALL RESPONSES TO PHQ QUESTIONS 1-9: 0

## 2025-04-08 ENCOUNTER — OFFICE VISIT (OUTPATIENT)
Age: 80
End: 2025-04-08
Payer: MEDICARE

## 2025-04-08 VITALS
BODY MASS INDEX: 26.68 KG/M2 | HEART RATE: 50 BPM | HEIGHT: 66 IN | RESPIRATION RATE: 16 BRPM | SYSTOLIC BLOOD PRESSURE: 131 MMHG | DIASTOLIC BLOOD PRESSURE: 81 MMHG | WEIGHT: 166 LBS | TEMPERATURE: 97.3 F | OXYGEN SATURATION: 98 %

## 2025-04-08 DIAGNOSIS — I48.0 PAROXYSMAL A-FIB (HCC): ICD-10-CM

## 2025-04-08 DIAGNOSIS — I10 BENIGN HYPERTENSION: ICD-10-CM

## 2025-04-08 DIAGNOSIS — I25.10 CORONARY ARTERY DISEASE INVOLVING NATIVE CORONARY ARTERY OF NATIVE HEART WITHOUT ANGINA PECTORIS: ICD-10-CM

## 2025-04-08 DIAGNOSIS — Z00.00 MEDICARE ANNUAL WELLNESS VISIT, SUBSEQUENT: Primary | ICD-10-CM

## 2025-04-08 DIAGNOSIS — F41.1 GAD (GENERALIZED ANXIETY DISORDER): ICD-10-CM

## 2025-04-08 DIAGNOSIS — E03.9 PRIMARY HYPOTHYROIDISM: ICD-10-CM

## 2025-04-08 DIAGNOSIS — C78.6 MALIGNANT NEOPLASM METASTATIC TO PERITONEUM (HCC): ICD-10-CM

## 2025-04-08 PROCEDURE — 1125F AMNT PAIN NOTED PAIN PRSNT: CPT | Performed by: FAMILY MEDICINE

## 2025-04-08 PROCEDURE — 3078F DIAST BP <80 MM HG: CPT | Performed by: FAMILY MEDICINE

## 2025-04-08 PROCEDURE — 1123F ACP DISCUSS/DSCN MKR DOCD: CPT | Performed by: FAMILY MEDICINE

## 2025-04-08 PROCEDURE — 3075F SYST BP GE 130 - 139MM HG: CPT | Performed by: FAMILY MEDICINE

## 2025-04-08 PROCEDURE — 1160F RVW MEDS BY RX/DR IN RCRD: CPT | Performed by: FAMILY MEDICINE

## 2025-04-08 PROCEDURE — 1159F MED LIST DOCD IN RCRD: CPT | Performed by: FAMILY MEDICINE

## 2025-04-08 PROCEDURE — G0439 PPPS, SUBSEQ VISIT: HCPCS | Performed by: FAMILY MEDICINE

## 2025-04-08 RX ORDER — BUSPIRONE HYDROCHLORIDE 10 MG/1
10 TABLET ORAL 3 TIMES DAILY
Qty: 270 TABLET | Refills: 3 | Status: SHIPPED | OUTPATIENT
Start: 2025-04-08

## 2025-04-08 NOTE — PROGRESS NOTES
Take 1 capsule by mouth daily  Giles Ziegler MD   amLODIPine (NORVASC) 10 MG tablet Take 1 tablet by mouth at bedtime  Giles Ziegler MD   apixaban (ELIQUIS) 5 MG TABS tablet Take 1 tablet by mouth 2 times daily  Giles Ziegler MD   atorvastatin (LIPITOR) 20 MG tablet Take 1 tablet by mouth daily  Giles Ziegler MD   fluticasone (FLONASE) 50 MCG/ACT nasal spray 2 sprays by Nasal route daily  Giles Ziegler MD   metoprolol succinate (TOPROL XL) 100 MG extended release tablet Take 0.5 tablets by mouth daily  Giles Ziegler MD   sertraline (ZOLOFT) 50 MG tablet Take 1 tablet by mouth daily  Giles Ziegler MD   Multiple Vitamins-Minerals (MULTIVITAMIN ADULTS PO) Take 1 tablet by mouth daily  Giles Ziegler MD   levothyroxine (SYNTHROID) 25 MCG tablet Take 1 tablet by mouth daily 1 tab po daily except 2 tabs po every Sunday  eff: 11/21/24  Provider, Historical, MD       CareTeam (Including outside providers/suppliers regularly involved in providing care):   Patient Care Team:  Zechariah Oquendo DO as PCP - General (Family Medicine)  Zechariah Oquendo DO as PCP - Empaneled Provider     Recommendations for Preventive Services Due: see orders and patient instructions/AVS.  Recommended screening schedule for the next 5-10 years is provided to the patient in written form: see Patient Instructions/AVS.     Reviewed and updated this visit:  Tobacco  Allergies  Meds  Problems  Med Hx  Surg Hx  Fam Hx

## 2025-04-08 NOTE — PATIENT INSTRUCTIONS

## 2025-05-19 DIAGNOSIS — E03.9 PRIMARY HYPOTHYROIDISM: Primary | ICD-10-CM

## 2025-05-19 RX ORDER — LEVOTHYROXINE SODIUM 25 UG/1
25 TABLET ORAL DAILY
Qty: 90 TABLET | Refills: 3 | Status: SHIPPED | OUTPATIENT
Start: 2025-05-19

## 2025-05-19 NOTE — TELEPHONE ENCOUNTER
He needs his Levothyroxine 25mcg refilled at  [facility] in [place] now for 90 days

## 2025-05-19 NOTE — TELEPHONE ENCOUNTER
Name of Medication(s) Requested:  Requested Prescriptions     Pending Prescriptions Disp Refills    levothyroxine (SYNTHROID) 25 MCG tablet 90 tablet 3     Sig: Take 1 tablet by mouth daily 1 tab po daily except 2 tabs po every Sunday  eff: 11/21/24       Medication is on current medication list Yes    Dosage and directions were verified? Yes    Quantity verified: 90 day supply     Pharmacy Verified?  Yes    Last Appointment:  4/8/2025    Future appts:  Future Appointments   Date Time Provider Department Center   8/25/2025  8:30 AM Tre Blevins MD ELECTRO PHYS [facility]   10/8/2025 10:45 AM Zechariah Oquendo, DO [facility] DEP   10/20/2025  2:00 PM Chapo Wright MD [place] [facility]   4/9/2026 11:00 AM Zechariah Oquendo, DO [facility] DE[facility]        (If no appt send self scheduling link. .REFILLAPPT)  Scheduling request sent?     [] Yes  [x] No    Does patient need updated?  [] Yes  [x] No

## 2025-07-03 ENCOUNTER — HOSPITAL ENCOUNTER (OUTPATIENT)
Dept: RADIOLOGY | Facility: HOSPITAL | Age: 80
Discharge: HOME | End: 2025-07-03
Payer: MEDICARE

## 2025-07-03 DIAGNOSIS — R91.1 SOLITARY LUNG NODULE: ICD-10-CM

## 2025-07-03 DIAGNOSIS — J84.9 INTERSTITIAL LUNG DISEASE (MULTI): ICD-10-CM

## 2025-07-03 PROCEDURE — 71250 CT THORAX DX C-: CPT

## 2025-07-14 ENCOUNTER — OFFICE VISIT (OUTPATIENT)
Dept: PULMONOLOGY | Facility: HOSPITAL | Age: 80
End: 2025-07-14
Payer: MEDICARE

## 2025-07-14 VITALS
DIASTOLIC BLOOD PRESSURE: 71 MMHG | BODY MASS INDEX: 28.36 KG/M2 | TEMPERATURE: 97.1 F | OXYGEN SATURATION: 96 % | HEIGHT: 65 IN | RESPIRATION RATE: 16 BRPM | WEIGHT: 170.2 LBS | HEART RATE: 57 BPM | SYSTOLIC BLOOD PRESSURE: 159 MMHG

## 2025-07-14 DIAGNOSIS — J30.9 ALLERGIC RHINITIS, UNSPECIFIED SEASONALITY, UNSPECIFIED TRIGGER: ICD-10-CM

## 2025-07-14 DIAGNOSIS — J84.9 INTERSTITIAL LUNG DISEASE (MULTI): Primary | ICD-10-CM

## 2025-07-14 DIAGNOSIS — G47.33 OSA ON CPAP: ICD-10-CM

## 2025-07-14 DIAGNOSIS — J30.2 SEASONAL ALLERGIC RHINITIS, UNSPECIFIED TRIGGER: ICD-10-CM

## 2025-07-14 DIAGNOSIS — R91.1 SOLITARY LUNG NODULE: ICD-10-CM

## 2025-07-14 PROCEDURE — 1159F MED LIST DOCD IN RCRD: CPT | Performed by: INTERNAL MEDICINE

## 2025-07-14 PROCEDURE — 99212 OFFICE O/P EST SF 10 MIN: CPT

## 2025-07-14 PROCEDURE — 99214 OFFICE O/P EST MOD 30 MIN: CPT | Performed by: INTERNAL MEDICINE

## 2025-07-14 RX ORDER — LORATADINE 10 MG/1
10 TABLET ORAL DAILY
Qty: 90 TABLET | Refills: 3 | Status: SHIPPED | OUTPATIENT
Start: 2025-07-14 | End: 2026-07-14

## 2025-07-14 RX ORDER — FLUTICASONE PROPIONATE 50 MCG
1 SPRAY, SUSPENSION (ML) NASAL 2 TIMES DAILY
Qty: 1 G | Refills: 11 | Status: SHIPPED | OUTPATIENT
Start: 2025-07-14 | End: 2025-07-16 | Stop reason: SDUPTHER

## 2025-07-14 RX ORDER — OMEPRAZOLE 20 MG/1
20 CAPSULE, DELAYED RELEASE ORAL
COMMUNITY

## 2025-07-14 ASSESSMENT — ENCOUNTER SYMPTOMS
DEPRESSION: 0
WHEEZING: 0
RHINORRHEA: 0
FATIGUE: 0
LOSS OF SENSATION IN FEET: 0
OCCASIONAL FEELINGS OF UNSTEADINESS: 0
CHILLS: 0
COUGH: 0
UNEXPECTED WEIGHT CHANGE: 0
FEVER: 0
SHORTNESS OF BREATH: 1

## 2025-07-14 ASSESSMENT — COLUMBIA-SUICIDE SEVERITY RATING SCALE - C-SSRS
1. IN THE PAST MONTH, HAVE YOU WISHED YOU WERE DEAD OR WISHED YOU COULD GO TO SLEEP AND NOT WAKE UP?: NO
2. HAVE YOU ACTUALLY HAD ANY THOUGHTS OF KILLING YOURSELF?: NO
6. HAVE YOU EVER DONE ANYTHING, STARTED TO DO ANYTHING, OR PREPARED TO DO ANYTHING TO END YOUR LIFE?: NO

## 2025-07-14 ASSESSMENT — PATIENT HEALTH QUESTIONNAIRE - PHQ9
1. LITTLE INTEREST OR PLEASURE IN DOING THINGS: NOT AT ALL
SUM OF ALL RESPONSES TO PHQ9 QUESTIONS 1 AND 2: 0
2. FEELING DOWN, DEPRESSED OR HOPELESS: NOT AT ALL

## 2025-07-14 NOTE — PROGRESS NOTES
"Subjective   Patient ID: Alin Kessler is a 79 y.o. male who presents for Follow up for pulmonary fibrosis.     HPI: Patient has PMH of CAD, depression, HTN, HLD, ALEX, and atrial fibrillation. He is here for a second opinion. His wife and son are present with him today. He states that he was getting preoperative clearance for an ablation and he was told he had pulmonary fibrosis. He had one CT chest done in September 2023. He then saw a pulmonologist that told him he had mild idiopathic pulmonary fibrosis and that he needed to enjoy life, wife reports they told him he had about 3-5 years life expectancy. He is not on any oxygen. He states that he has shortness of breath but this has improved after having the ablation. He has an occasional productive cough and sinus drainage. He denies any history of CTD. He is a former smoker of only 10 years, quit 40 years ago and smoked 1 ppd. He has a history of working in factories. He has ALEX and is on CPAP. His sister had pulmonary fibrosis also. He climbs up 14 steps to go his house without shortness of breath. He still has mild cough related to postnasal drip.  Wife states that he is a couch potato.     He is here for follow up accompanied by his wife. Pt states he is doing good without any progression of his SOB or cough. \"SOB is a lot better that it used to be since the ablation of a.fib.\" He has SOB only with strenuous activity and cough is only intermittent. 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 He used to wears his CPAP nightly and has controlled daytime symptoms and good sleep quality. During the visit in January 2025 he reported that he has not worn since November 2024 and was encouraged to use it nightly. He brought his APAP with him today and only has used for about 24 days in the recent month.      Review of Systems   Constitutional:  Negative for chills, fatigue, fever and unexpected weight change. " "  HENT:  Negative for congestion, postnasal drip and rhinorrhea.    Respiratory:  Positive for shortness of breath. Negative for cough (denies hemoptysis.) and wheezing.    Cardiovascular:  Negative for chest pain and leg swelling.   All other systems reviewed and are negative.      Objective   Physical Exam  Vitals reviewed.   Constitutional:       Appearance: Normal appearance.   HENT:      Head: Normocephalic.   Cardiovascular:      Rate and Rhythm: Normal rate and regular rhythm.   Pulmonary:      Effort: Pulmonary effort is normal.      Breath sounds: Rales present.   Skin:     General: Skin is warm and dry.   Neurological:      Mental Status: He is alert.       Assessment/Plan   Pulmonary fibrosis/ILD  Former light smoker  ALEX, on CPAP  Allergic rhinitis  Anemia  Lung nodule 7 mm lingula    Plan:    Patient presented to us initially for a second opinion for pulmonary fibrosis. He states prior pulmonologist diagnosed him with idiopathic pulmonary fibrosis and stated his life expectancy was 3-5 years. He brought his PFT results today which showed no restriction and no obstruction, his DLCO was reduced at 39. He had one CT chest that was done September 2023, the report states mild bronchiectasis in lower lungs and sub linear fibrotic changes without. He is short of breath on exertion but states this has improved after having his ablation. He is not on any oxygen, he is 96% on RA. He currently denies any significant respiratory symptoms.     -HRCT repeat discussed from June 2024 with subpleural reticulations and traction bronchiectasis and no honeycombing \"probable UIP' and 7 mm lingular nodule. CT chest Dec 27, 24 without changes. Repeat CT chest in July 2025 without progression. Defer repeat CT chest as symptoms are also stable at this time.   -PFTs from April 2024 with no obstruction or restriction, TLC was 96, DLCO 39. Discussed results at length.   -CTD, HP panel, IGE/RAST neg. RA factor and YARED + but ccp neg " and YARED positive with DSDNA neg.     -He is a former smoker at 1 ppd x 10 years, quit 40 years ago. No obstruction on PFTs.  -6MWT done in office, he did not qualify for supplemental oxygen he was at his lowest 90% on RA.    -He has ALEX and was using CPAP nightly. He states he stopped using November 2024 but he sleeps better and wife does not notice snoring. He still has residual EDS and fatigue though; states he is not sure if he felt improvement in his energy levels and alertness during day. [facility] or [facility] initially. Pt states that he would rather start using it again and bring PAP for me. He did bring it today and only has worn for the last 24 days. I encouraged him to use it every night and use it >4hrs a night. Changed APAP 4-20 cwp to 4 to 14 cwp. P95 11.9 cwp.     -Recommend Loratadine and Flonase prn. He is using flonase and notes improvement.  -Hb in 2018 around 8 g. Recommend obtain repeat test results from PCP.   -Follows with cardiologist for coronary artery calcifications through PCP at [facility].    Overall I discussed at length that with current testing I have currently this does not appear to be a progressive ILD. D/d includes postinflammatory pulmonary fibrosis. His breathing has actually improved since ablation, he does not qualify for oxygen, and his PFTs have no restriction. His CTD and HP panel work up was practically negative. His repeat CT chest again does not show change in ILD and lung nodule. Advised patient to remain active with a regular physical activity regimen. Advised to use APAP every night especially with adjustment today down to APAP 4-14 cwp. If he has any worsening prior to next OV pt or issues in tolerating PAP, advised pt and his family to contact us earlier than scheduled visit.

## 2025-07-14 NOTE — PATIENT INSTRUCTIONS
Please use CPAP nightly. Please report any issues in using your CPAP and bring your CPAP with you to next appt.  Continue on Flonase nasal spray twice a day as needed.  Continue to take Loratadine 10 mg daily as needed for sinus drainage.  Call with any questions or concerns.  Follow up with Dr. Rojas in April 2026.

## 2025-07-16 DIAGNOSIS — J30.9 ALLERGIC RHINITIS, UNSPECIFIED SEASONALITY, UNSPECIFIED TRIGGER: ICD-10-CM

## 2025-07-16 RX ORDER — FLUTICASONE PROPIONATE 50 MCG
1 SPRAY, SUSPENSION (ML) NASAL 2 TIMES DAILY
Qty: 48 G | Refills: 3 | Status: SHIPPED | OUTPATIENT
Start: 2025-07-16

## 2025-08-25 ENCOUNTER — OFFICE VISIT (OUTPATIENT)
Dept: NON INVASIVE DIAGNOSTICS | Age: 80
End: 2025-08-25
Payer: MEDICARE

## 2025-08-25 VITALS
TEMPERATURE: 97.3 F | DIASTOLIC BLOOD PRESSURE: 64 MMHG | SYSTOLIC BLOOD PRESSURE: 122 MMHG | HEART RATE: 60 BPM | OXYGEN SATURATION: 92 % | BODY MASS INDEX: 27.16 KG/M2 | WEIGHT: 169 LBS | HEIGHT: 66 IN | RESPIRATION RATE: 16 BRPM

## 2025-08-25 DIAGNOSIS — Z95.818 STATUS POST PLACEMENT OF IMPLANTABLE LOOP RECORDER: ICD-10-CM

## 2025-08-25 DIAGNOSIS — I48.0 PAROXYSMAL A-FIB (HCC): Primary | ICD-10-CM

## 2025-08-25 PROCEDURE — 93000 ELECTROCARDIOGRAM COMPLETE: CPT | Performed by: INTERNAL MEDICINE

## 2025-08-25 PROCEDURE — 99205 OFFICE O/P NEW HI 60 MIN: CPT | Performed by: INTERNAL MEDICINE

## 2025-08-25 PROCEDURE — 3078F DIAST BP <80 MM HG: CPT | Performed by: INTERNAL MEDICINE

## 2025-08-25 PROCEDURE — 93285 PRGRMG DEV EVAL SCRMS IP: CPT | Performed by: INTERNAL MEDICINE

## 2025-08-25 PROCEDURE — 1123F ACP DISCUSS/DSCN MKR DOCD: CPT | Performed by: INTERNAL MEDICINE

## 2025-08-25 PROCEDURE — 1160F RVW MEDS BY RX/DR IN RCRD: CPT | Performed by: INTERNAL MEDICINE

## 2025-08-25 PROCEDURE — 1159F MED LIST DOCD IN RCRD: CPT | Performed by: INTERNAL MEDICINE

## 2025-08-25 PROCEDURE — 3074F SYST BP LT 130 MM HG: CPT | Performed by: INTERNAL MEDICINE

## 2026-04-06 ENCOUNTER — APPOINTMENT (OUTPATIENT)
Dept: PULMONOLOGY | Facility: HOSPITAL | Age: 81
End: 2026-04-06
Payer: MEDICARE

## (undated) DEVICE — BLADE CLIPPER GEN PURP NS

## (undated) DEVICE — SUTURE V-LOC 180 SZ 2-0 L6IN ABSRB GRN GS-22 L27MM 1/2 CIR VLOCL2105

## (undated) DEVICE — WARMER SCP 2 ANTIFOG LAP DISP

## (undated) DEVICE — NEEDLE HYPO 23GA L1.5IN TURQ POLYPR HUB S STL THN WALL IM

## (undated) DEVICE — 1LYRTR 16FR10ML 100%SILI SNAP: Brand: MEDLINE INDUSTRIES, INC.

## (undated) DEVICE — TOWEL,OR,DSP,ST,BLUE,STD,6/PK,12PK/CS: Brand: MEDLINE

## (undated) DEVICE — ELECTRODE PT RET AD L9FT HI MOIST COND ADH HYDRGEL CORDED

## (undated) DEVICE — GOWN,SIRUS,FABRNF,XL,20/CS: Brand: MEDLINE

## (undated) DEVICE — LIQUIBAND RAPID ADHESIVE 36/CS 0.8ML: Brand: MEDLINE

## (undated) DEVICE — KIT,ANTI FOG,W/SPONGE & FLUID,SOFT PACK: Brand: MEDLINE

## (undated) DEVICE — GLOVE ORANGE PI 7 1/2   MSG9075

## (undated) DEVICE — MEGA SUTURECUT ND: Brand: ENDOWRIST

## (undated) DEVICE — DOUBLE BASIN SET: Brand: MEDLINE INDUSTRIES, INC.

## (undated) DEVICE — BLADELESS OBTURATOR: Brand: WECK VISTA

## (undated) DEVICE — COVER,MAYO STAND,STERILE: Brand: MEDLINE

## (undated) DEVICE — INSUFFLATION TUBING SET WITH FILTER, FUNNEL CONNECTOR AND LUER LOCK: Brand: JOSNOE MEDICAL INC

## (undated) DEVICE — DRAPE,LAP,CHOLE,W/TROUGHS,STERILE: Brand: MEDLINE

## (undated) DEVICE — Device

## (undated) DEVICE — COLUMN DRAPE

## (undated) DEVICE — SYRINGE 20ML LL S/C 50

## (undated) DEVICE — ARM DRAPE

## (undated) DEVICE — SEAL

## (undated) DEVICE — INSUFFLATION NEEDLE TO ESTABLISH PNEUMOPERITONEUM.: Brand: INSUFFLATION NEEDLE

## (undated) DEVICE — PROGRASP FORCEPS: Brand: ENDOWRIST

## (undated) DEVICE — BLADE,STAINLESS-STEEL,11,STRL,DISPOSABLE: Brand: MEDLINE

## (undated) DEVICE — APPLICATOR MEDICATED 26 CC SOLUTION HI LT ORNG CHLORAPREP